# Patient Record
Sex: FEMALE | Race: WHITE | NOT HISPANIC OR LATINO | Employment: OTHER | URBAN - METROPOLITAN AREA
[De-identification: names, ages, dates, MRNs, and addresses within clinical notes are randomized per-mention and may not be internally consistent; named-entity substitution may affect disease eponyms.]

---

## 2017-01-09 ENCOUNTER — GENERIC CONVERSION - ENCOUNTER (OUTPATIENT)
Dept: OTHER | Facility: OTHER | Age: 67
End: 2017-01-09

## 2018-01-12 NOTE — RESULT NOTES
Verified Results  (Q) THINPREP TIS PAP RFX HPV [id number] 12:00AM Alexandra Weaver     Test Name Result Flag Reference   CLINICAL INFORMATION:      17ET P2   LMP:      NONE GIVEN   PREV  PAP:      NO HX ABNL PAP   PREV  BX:      NONE GIVEN   SOURCE:      Cervix, Endocervix   STATEMENT OF ADEQUACY:      SATISFACTORY FOR EVALUATION   INTERPRETATION/RESULT:      Negative for intraepithelial lesion or malignancy  Atrophic pattern; predominantly parabasal cells   COMMENT:      This Pap test has been evaluated with computer  assisted technology     CYTOTECHNOLOGIST:      ADD, CT(ASCP)  CT screening location:  [address] [facility],  [address] [place] [address]

## 2019-02-18 ENCOUNTER — APPOINTMENT (OUTPATIENT)
Dept: LAB | Facility: CLINIC | Age: 69
End: 2019-02-18
Payer: MEDICARE

## 2019-02-18 ENCOUNTER — TRANSCRIBE ORDERS (OUTPATIENT)
Dept: ADMINISTRATIVE | Facility: HOSPITAL | Age: 69
End: 2019-02-18

## 2019-02-18 DIAGNOSIS — R94.6 NONSPECIFIC ABNORMAL RESULTS OF THYROID FUNCTION STUDY: ICD-10-CM

## 2019-02-18 DIAGNOSIS — R94.6 NONSPECIFIC ABNORMAL RESULTS OF THYROID FUNCTION STUDY: Primary | ICD-10-CM

## 2019-02-18 LAB — TSH SERPL DL<=0.05 MIU/L-ACNC: 2.25 UIU/ML (ref 0.36–3.74)

## 2019-02-18 PROCEDURE — 36415 COLL VENOUS BLD VENIPUNCTURE: CPT

## 2019-02-18 PROCEDURE — 84443 ASSAY THYROID STIM HORMONE: CPT

## 2019-07-19 ENCOUNTER — TRANSCRIBE ORDERS (OUTPATIENT)
Dept: ADMINISTRATIVE | Facility: HOSPITAL | Age: 69
End: 2019-07-19

## 2019-07-19 ENCOUNTER — APPOINTMENT (OUTPATIENT)
Dept: LAB | Facility: CLINIC | Age: 69
End: 2019-07-19
Payer: MEDICARE

## 2019-07-19 DIAGNOSIS — E78.5 HYPERLIPIDEMIA, UNSPECIFIED HYPERLIPIDEMIA TYPE: ICD-10-CM

## 2019-07-19 DIAGNOSIS — E78.5 HYPERLIPIDEMIA, UNSPECIFIED HYPERLIPIDEMIA TYPE: Primary | ICD-10-CM

## 2019-07-19 LAB
ALBUMIN SERPL BCP-MCNC: 3.6 G/DL (ref 3.5–5)
ALP SERPL-CCNC: 76 U/L (ref 46–116)
ALT SERPL W P-5'-P-CCNC: 20 U/L (ref 12–78)
ANION GAP SERPL CALCULATED.3IONS-SCNC: 2 MMOL/L (ref 4–13)
AST SERPL W P-5'-P-CCNC: 13 U/L (ref 5–45)
BASOPHILS # BLD AUTO: 0.06 THOUSANDS/ΜL (ref 0–0.1)
BASOPHILS NFR BLD AUTO: 1 % (ref 0–1)
BILIRUB SERPL-MCNC: 0.92 MG/DL (ref 0.2–1)
BUN SERPL-MCNC: 16 MG/DL (ref 5–25)
CALCIUM SERPL-MCNC: 9.2 MG/DL (ref 8.3–10.1)
CHLORIDE SERPL-SCNC: 110 MMOL/L (ref 100–108)
CHOLEST SERPL-MCNC: 263 MG/DL (ref 50–200)
CO2 SERPL-SCNC: 27 MMOL/L (ref 21–32)
CREAT SERPL-MCNC: 0.62 MG/DL (ref 0.6–1.3)
EOSINOPHIL # BLD AUTO: 0.32 THOUSAND/ΜL (ref 0–0.61)
EOSINOPHIL NFR BLD AUTO: 5 % (ref 0–6)
ERYTHROCYTE [DISTWIDTH] IN BLOOD BY AUTOMATED COUNT: 12.5 % (ref 11.6–15.1)
GFR SERPL CREATININE-BSD FRML MDRD: 92 ML/MIN/1.73SQ M
GLUCOSE P FAST SERPL-MCNC: 93 MG/DL (ref 65–99)
HCT VFR BLD AUTO: 37.6 % (ref 34.8–46.1)
HDLC SERPL-MCNC: 62 MG/DL (ref 40–60)
HGB BLD-MCNC: 12.7 G/DL (ref 11.5–15.4)
IMM GRANULOCYTES # BLD AUTO: 0.03 THOUSAND/UL (ref 0–0.2)
IMM GRANULOCYTES NFR BLD AUTO: 1 % (ref 0–2)
LDLC SERPL CALC-MCNC: 180 MG/DL (ref 0–100)
LYMPHOCYTES # BLD AUTO: 2.69 THOUSANDS/ΜL (ref 0.6–4.47)
LYMPHOCYTES NFR BLD AUTO: 41 % (ref 14–44)
MCH RBC QN AUTO: 32.9 PG (ref 26.8–34.3)
MCHC RBC AUTO-ENTMCNC: 33.8 G/DL (ref 31.4–37.4)
MCV RBC AUTO: 97 FL (ref 82–98)
MONOCYTES # BLD AUTO: 0.67 THOUSAND/ΜL (ref 0.17–1.22)
MONOCYTES NFR BLD AUTO: 10 % (ref 4–12)
NEUTROPHILS # BLD AUTO: 2.86 THOUSANDS/ΜL (ref 1.85–7.62)
NEUTS SEG NFR BLD AUTO: 42 % (ref 43–75)
NONHDLC SERPL-MCNC: 201 MG/DL
NRBC BLD AUTO-RTO: 0 /100 WBCS
PLATELET # BLD AUTO: 328 THOUSANDS/UL (ref 149–390)
PMV BLD AUTO: 9.5 FL (ref 8.9–12.7)
POTASSIUM SERPL-SCNC: 4.5 MMOL/L (ref 3.5–5.3)
PROT SERPL-MCNC: 7.5 G/DL (ref 6.4–8.2)
RBC # BLD AUTO: 3.86 MILLION/UL (ref 3.81–5.12)
SODIUM SERPL-SCNC: 139 MMOL/L (ref 136–145)
TRIGL SERPL-MCNC: 107 MG/DL
TSH SERPL DL<=0.05 MIU/L-ACNC: 2.99 UIU/ML (ref 0.36–3.74)
WBC # BLD AUTO: 6.63 THOUSAND/UL (ref 4.31–10.16)

## 2019-07-19 PROCEDURE — 80061 LIPID PANEL: CPT

## 2019-07-19 PROCEDURE — 85025 COMPLETE CBC W/AUTO DIFF WBC: CPT

## 2019-07-19 PROCEDURE — 80053 COMPREHEN METABOLIC PANEL: CPT

## 2019-07-19 PROCEDURE — 36415 COLL VENOUS BLD VENIPUNCTURE: CPT

## 2019-07-19 PROCEDURE — 84443 ASSAY THYROID STIM HORMONE: CPT

## 2019-10-17 ENCOUNTER — APPOINTMENT (OUTPATIENT)
Dept: LAB | Facility: CLINIC | Age: 69
End: 2019-10-17
Payer: MEDICARE

## 2019-10-17 ENCOUNTER — TRANSCRIBE ORDERS (OUTPATIENT)
Dept: ADMINISTRATIVE | Facility: HOSPITAL | Age: 69
End: 2019-10-17

## 2019-10-17 DIAGNOSIS — E78.2 MIXED HYPERLIPIDEMIA: ICD-10-CM

## 2019-10-17 DIAGNOSIS — E78.2 MIXED HYPERLIPIDEMIA: Primary | ICD-10-CM

## 2019-10-17 LAB
ALBUMIN SERPL BCP-MCNC: 3.7 G/DL (ref 3.5–5)
ALP SERPL-CCNC: 92 U/L (ref 46–116)
ALT SERPL W P-5'-P-CCNC: 34 U/L (ref 12–78)
ANION GAP SERPL CALCULATED.3IONS-SCNC: 5 MMOL/L (ref 4–13)
AST SERPL W P-5'-P-CCNC: 22 U/L (ref 5–45)
BILIRUB SERPL-MCNC: 1.34 MG/DL (ref 0.2–1)
BUN SERPL-MCNC: 17 MG/DL (ref 5–25)
CALCIUM SERPL-MCNC: 9.1 MG/DL (ref 8.3–10.1)
CHLORIDE SERPL-SCNC: 107 MMOL/L (ref 100–108)
CHOLEST SERPL-MCNC: 167 MG/DL (ref 50–200)
CO2 SERPL-SCNC: 25 MMOL/L (ref 21–32)
CREAT SERPL-MCNC: 0.68 MG/DL (ref 0.6–1.3)
GFR SERPL CREATININE-BSD FRML MDRD: 89 ML/MIN/1.73SQ M
GLUCOSE P FAST SERPL-MCNC: 95 MG/DL (ref 65–99)
HDLC SERPL-MCNC: 67 MG/DL (ref 40–60)
LDLC SERPL CALC-MCNC: 79 MG/DL (ref 0–100)
NONHDLC SERPL-MCNC: 100 MG/DL
POTASSIUM SERPL-SCNC: 3.8 MMOL/L (ref 3.5–5.3)
PROT SERPL-MCNC: 7.5 G/DL (ref 6.4–8.2)
SODIUM SERPL-SCNC: 137 MMOL/L (ref 136–145)
TRIGL SERPL-MCNC: 103 MG/DL

## 2019-10-17 PROCEDURE — 80061 LIPID PANEL: CPT

## 2019-10-17 PROCEDURE — 80053 COMPREHEN METABOLIC PANEL: CPT

## 2019-10-17 PROCEDURE — 36415 COLL VENOUS BLD VENIPUNCTURE: CPT

## 2020-01-28 ENCOUNTER — LAB REQUISITION (OUTPATIENT)
Dept: LAB | Facility: HOSPITAL | Age: 70
End: 2020-01-28
Payer: MEDICARE

## 2020-01-28 DIAGNOSIS — H18.519 ENDOTHELIAL CORNEAL DYSTROPHY: ICD-10-CM

## 2020-01-29 PROCEDURE — 87102 FUNGUS ISOLATION CULTURE: CPT | Performed by: OPHTHALMOLOGY

## 2020-01-29 PROCEDURE — 87075 CULTR BACTERIA EXCEPT BLOOD: CPT | Performed by: OPHTHALMOLOGY

## 2020-01-29 PROCEDURE — 87205 SMEAR GRAM STAIN: CPT | Performed by: OPHTHALMOLOGY

## 2020-01-29 PROCEDURE — 87070 CULTURE OTHR SPECIMN AEROBIC: CPT | Performed by: OPHTHALMOLOGY

## 2020-01-31 LAB
BACTERIA EYE AEROBE CULT: NO GROWTH
BACTERIA SPEC ANAEROBE CULT: NO GROWTH
GRAM STN SPEC: NORMAL

## 2020-03-02 LAB — FUNGUS SPEC CULT: NORMAL

## 2020-12-21 ENCOUNTER — TRANSCRIBE ORDERS (OUTPATIENT)
Dept: ADMINISTRATIVE | Facility: HOSPITAL | Age: 70
End: 2020-12-21

## 2020-12-21 DIAGNOSIS — Z12.31 ENCOUNTER FOR SCREENING MAMMOGRAM FOR MALIGNANT NEOPLASM OF BREAST: Primary | ICD-10-CM

## 2021-03-04 DIAGNOSIS — Z23 ENCOUNTER FOR IMMUNIZATION: ICD-10-CM

## 2021-07-22 ENCOUNTER — HOSPITAL ENCOUNTER (OUTPATIENT)
Dept: MAMMOGRAPHY | Facility: IMAGING CENTER | Age: 71
Discharge: HOME | End: 2021-07-22
Payer: MEDICARE

## 2021-07-22 VITALS — WEIGHT: 160 LBS | HEIGHT: 64 IN | BODY MASS INDEX: 27.31 KG/M2

## 2021-07-22 DIAGNOSIS — Z12.31 ENCOUNTER FOR SCREENING MAMMOGRAM FOR MALIGNANT NEOPLASM OF BREAST: ICD-10-CM

## 2021-07-22 PROCEDURE — 77063 BREAST TOMOSYNTHESIS BI: CPT

## 2021-07-22 PROCEDURE — 77067 SCR MAMMO BI INCL CAD: CPT

## 2021-09-03 ENCOUNTER — NEW PATIENT (OUTPATIENT)
Dept: URBAN - METROPOLITAN AREA CLINIC 6 | Facility: CLINIC | Age: 71
End: 2021-09-03

## 2021-09-03 DIAGNOSIS — H40.1192: ICD-10-CM

## 2021-09-03 DIAGNOSIS — H18.512: ICD-10-CM

## 2021-09-03 PROCEDURE — 92004 COMPRE OPH EXAM NEW PT 1/>: CPT

## 2021-09-03 PROCEDURE — 92020 GONIOSCOPY: CPT

## 2021-09-03 PROCEDURE — 92133 CPTRZD OPH DX IMG PST SGM ON: CPT

## 2021-09-03 PROCEDURE — 92202 OPSCPY EXTND ON/MAC DRAW: CPT

## 2021-09-03 PROCEDURE — 76514 ECHO EXAM OF EYE THICKNESS: CPT

## 2021-09-03 ASSESSMENT — PACHYMETRY
OD_CT_UM: 589
OS_CT_UM: 593

## 2021-09-03 ASSESSMENT — VISUAL ACUITY
OD_CC: 20/30+1
OS_CC: 20/30

## 2021-09-03 ASSESSMENT — TONOMETRY
OD_IOP_MMHG: 20
OS_IOP_MMHG: 18

## 2022-04-06 ENCOUNTER — APPOINTMENT (OUTPATIENT)
Dept: LAB | Facility: CLINIC | Age: 72
End: 2022-04-06
Payer: MEDICARE

## 2022-04-06 DIAGNOSIS — E03.8 TOXIC DIFFUSE GOITER WITH PRETIBIAL MYXEDEMA: ICD-10-CM

## 2022-04-06 DIAGNOSIS — E55.9 AVITAMINOSIS D: ICD-10-CM

## 2022-04-06 DIAGNOSIS — E78.2 MIXED HYPERLIPIDEMIA: ICD-10-CM

## 2022-04-06 DIAGNOSIS — E05.00 TOXIC DIFFUSE GOITER WITH PRETIBIAL MYXEDEMA: ICD-10-CM

## 2022-04-06 LAB
25(OH)D3 SERPL-MCNC: 57.6 NG/ML (ref 30–100)
ALBUMIN SERPL BCP-MCNC: 3.7 G/DL (ref 3.5–5)
ALP SERPL-CCNC: 68 U/L (ref 46–116)
ALT SERPL W P-5'-P-CCNC: 25 U/L (ref 12–78)
ANION GAP SERPL CALCULATED.3IONS-SCNC: 3 MMOL/L (ref 4–13)
AST SERPL W P-5'-P-CCNC: 23 U/L (ref 5–45)
BASOPHILS # BLD AUTO: 0.04 THOUSANDS/ΜL (ref 0–0.1)
BASOPHILS NFR BLD AUTO: 1 % (ref 0–1)
BILIRUB SERPL-MCNC: 1.13 MG/DL (ref 0.2–1)
BUN SERPL-MCNC: 9 MG/DL (ref 5–25)
CALCIUM SERPL-MCNC: 9.5 MG/DL (ref 8.3–10.1)
CHLORIDE SERPL-SCNC: 111 MMOL/L (ref 100–108)
CHOLEST SERPL-MCNC: 150 MG/DL
CO2 SERPL-SCNC: 27 MMOL/L (ref 21–32)
CREAT SERPL-MCNC: 0.65 MG/DL (ref 0.6–1.3)
EOSINOPHIL # BLD AUTO: 0.29 THOUSAND/ΜL (ref 0–0.61)
EOSINOPHIL NFR BLD AUTO: 5 % (ref 0–6)
ERYTHROCYTE [DISTWIDTH] IN BLOOD BY AUTOMATED COUNT: 12.5 % (ref 11.6–15.1)
GFR SERPL CREATININE-BSD FRML MDRD: 88 ML/MIN/1.73SQ M
GLUCOSE P FAST SERPL-MCNC: 101 MG/DL (ref 65–99)
HCT VFR BLD AUTO: 39.8 % (ref 34.8–46.1)
HDLC SERPL-MCNC: 63 MG/DL
HGB BLD-MCNC: 13.3 G/DL (ref 11.5–15.4)
IMM GRANULOCYTES # BLD AUTO: 0.02 THOUSAND/UL (ref 0–0.2)
IMM GRANULOCYTES NFR BLD AUTO: 0 % (ref 0–2)
LDLC SERPL CALC-MCNC: 72 MG/DL (ref 0–100)
LYMPHOCYTES # BLD AUTO: 2.21 THOUSANDS/ΜL (ref 0.6–4.47)
LYMPHOCYTES NFR BLD AUTO: 41 % (ref 14–44)
MCH RBC QN AUTO: 32.6 PG (ref 26.8–34.3)
MCHC RBC AUTO-ENTMCNC: 33.4 G/DL (ref 31.4–37.4)
MCV RBC AUTO: 98 FL (ref 82–98)
MONOCYTES # BLD AUTO: 0.55 THOUSAND/ΜL (ref 0.17–1.22)
MONOCYTES NFR BLD AUTO: 10 % (ref 4–12)
NEUTROPHILS # BLD AUTO: 2.31 THOUSANDS/ΜL (ref 1.85–7.62)
NEUTS SEG NFR BLD AUTO: 43 % (ref 43–75)
NONHDLC SERPL-MCNC: 87 MG/DL
NRBC BLD AUTO-RTO: 0 /100 WBCS
PLATELET # BLD AUTO: 322 THOUSANDS/UL (ref 149–390)
PMV BLD AUTO: 9.4 FL (ref 8.9–12.7)
POTASSIUM SERPL-SCNC: 4.2 MMOL/L (ref 3.5–5.3)
PROT SERPL-MCNC: 7.3 G/DL (ref 6.4–8.2)
RBC # BLD AUTO: 4.08 MILLION/UL (ref 3.81–5.12)
SODIUM SERPL-SCNC: 141 MMOL/L (ref 136–145)
T4 FREE SERPL-MCNC: 1.04 NG/DL (ref 0.76–1.46)
TRIGL SERPL-MCNC: 77 MG/DL
TSH SERPL DL<=0.05 MIU/L-ACNC: 4.74 UIU/ML (ref 0.45–4.5)
WBC # BLD AUTO: 5.42 THOUSAND/UL (ref 4.31–10.16)

## 2022-04-06 PROCEDURE — 85025 COMPLETE CBC W/AUTO DIFF WBC: CPT

## 2022-04-06 PROCEDURE — 82306 VITAMIN D 25 HYDROXY: CPT

## 2022-04-06 PROCEDURE — 80061 LIPID PANEL: CPT

## 2022-04-06 PROCEDURE — 36415 COLL VENOUS BLD VENIPUNCTURE: CPT

## 2022-04-06 PROCEDURE — 84439 ASSAY OF FREE THYROXINE: CPT

## 2022-04-06 PROCEDURE — 80053 COMPREHEN METABOLIC PANEL: CPT

## 2022-04-06 PROCEDURE — 84443 ASSAY THYROID STIM HORMONE: CPT

## 2022-08-24 ENCOUNTER — HOSPITAL ENCOUNTER (OUTPATIENT)
Dept: MAMMOGRAPHY | Facility: IMAGING CENTER | Age: 72
Discharge: HOME | End: 2022-08-24
Payer: MEDICARE

## 2022-08-24 VITALS — BODY MASS INDEX: 23.73 KG/M2 | WEIGHT: 139 LBS | HEIGHT: 64 IN

## 2022-08-24 DIAGNOSIS — Z12.31 ENCOUNTER FOR SCREENING MAMMOGRAM FOR MALIGNANT NEOPLASM OF BREAST: ICD-10-CM

## 2022-08-24 PROCEDURE — 77063 BREAST TOMOSYNTHESIS BI: CPT

## 2022-08-24 PROCEDURE — 77067 SCR MAMMO BI INCL CAD: CPT

## 2023-09-21 ENCOUNTER — HOSPITAL ENCOUNTER (OUTPATIENT)
Dept: MAMMOGRAPHY | Facility: IMAGING CENTER | Age: 73
Discharge: HOME | End: 2023-09-21
Payer: MEDICARE

## 2023-09-21 VITALS — WEIGHT: 150 LBS | HEIGHT: 64 IN | BODY MASS INDEX: 25.61 KG/M2

## 2023-09-21 DIAGNOSIS — Z12.31 ENCOUNTER FOR SCREENING MAMMOGRAM FOR MALIGNANT NEOPLASM OF BREAST: ICD-10-CM

## 2023-09-21 PROCEDURE — 77063 BREAST TOMOSYNTHESIS BI: CPT

## 2023-09-21 PROCEDURE — 77067 SCR MAMMO BI INCL CAD: CPT

## 2024-02-14 ENCOUNTER — OFFICE VISIT (OUTPATIENT)
Dept: DERMATOLOGY | Facility: CLINIC | Age: 74
End: 2024-02-14
Payer: MEDICARE

## 2024-02-14 VITALS — HEIGHT: 64 IN | TEMPERATURE: 98.9 F | BODY MASS INDEX: 27.31 KG/M2 | WEIGHT: 160 LBS

## 2024-02-14 DIAGNOSIS — D18.01 CHERRY ANGIOMA: ICD-10-CM

## 2024-02-14 DIAGNOSIS — Z85.820 HISTORY OF MELANOMA: Primary | ICD-10-CM

## 2024-02-14 DIAGNOSIS — D22.61 MULTIPLE BENIGN MELANOCYTIC NEVI OF BOTH UPPER EXTREMITIES, BOTH LOWER EXTREMITIES, AND TRUNK: ICD-10-CM

## 2024-02-14 DIAGNOSIS — L81.4 SOLAR LENTIGO: ICD-10-CM

## 2024-02-14 DIAGNOSIS — L21.9 SEBORRHEIC DERMATITIS: ICD-10-CM

## 2024-02-14 DIAGNOSIS — L57.0 KERATOSIS, ACTINIC: ICD-10-CM

## 2024-02-14 DIAGNOSIS — D22.71 MULTIPLE BENIGN MELANOCYTIC NEVI OF BOTH UPPER EXTREMITIES, BOTH LOWER EXTREMITIES, AND TRUNK: ICD-10-CM

## 2024-02-14 DIAGNOSIS — L57.8 OTHER SKIN CHANGES DUE TO CHRONIC EXPOSURE TO NONIONIZING RADIATION: ICD-10-CM

## 2024-02-14 DIAGNOSIS — D22.72 MULTIPLE BENIGN MELANOCYTIC NEVI OF BOTH UPPER EXTREMITIES, BOTH LOWER EXTREMITIES, AND TRUNK: ICD-10-CM

## 2024-02-14 DIAGNOSIS — D22.5 MULTIPLE BENIGN MELANOCYTIC NEVI OF BOTH UPPER EXTREMITIES, BOTH LOWER EXTREMITIES, AND TRUNK: ICD-10-CM

## 2024-02-14 DIAGNOSIS — D22.62 MULTIPLE BENIGN MELANOCYTIC NEVI OF BOTH UPPER EXTREMITIES, BOTH LOWER EXTREMITIES, AND TRUNK: ICD-10-CM

## 2024-02-14 DIAGNOSIS — L82.1 SEBORRHEIC KERATOSIS: ICD-10-CM

## 2024-02-14 PROCEDURE — 17003 DESTRUCT PREMALG LES 2-14: CPT | Performed by: DERMATOLOGY

## 2024-02-14 PROCEDURE — 99204 OFFICE O/P NEW MOD 45 MIN: CPT | Performed by: DERMATOLOGY

## 2024-02-14 PROCEDURE — 17000 DESTRUCT PREMALG LESION: CPT | Performed by: DERMATOLOGY

## 2024-02-14 RX ORDER — KETOCONAZOLE 20 MG/G
CREAM TOPICAL DAILY
Qty: 30 G | Refills: 2 | Status: SHIPPED | OUTPATIENT
Start: 2024-02-14

## 2024-02-14 NOTE — PROGRESS NOTES
"[facility] Note     Patient Name: Kelli Arias  Encounter Date: 02/014/24     Have you been cared for by a [facility] Dermatologist in the last 3 years and, if so, which description applies to you?    NO.   I am considered a \"new\" patient and must complete all patient intake questions. I am FEMALE/of child-bearing potential.    REVIEW OF SYSTEMS:  Have you recently had or currently have any of the following? Recent fever or chills? No  Any non-healing wound? No  Are you pregnant or planning to become pregnant? No  Are you currently or planning to be nursing or breast feeding? No   PAST MEDICAL HISTORY:  Have you personally ever had or currently have any of the following?  If \"YES,\" then please provide more detail. Skin cancer (such as Melanoma, Basal Cell Carcinoma, Squamous Cell Carcinoma?  No  Tuberculosis, HIV/AIDS, Hepatitis B or C: No  Radiation Treatment  Hx of MM back    HISTORY OF IMMUNOSUPPRESSION:   Do you have a history of any of the following:  Systemic Immunosuppression such as Diabetes, Biologic or Immunotherapy, Chemotherapy, Organ Transplantation, Bone Marrow Transplantation?  No    Answering \"YES\" requires the addition of the dotphrase \"IMMUNOSUPPRESSED\" as the first diagnosis of the patient's visit.   FAMILY HISTORY:  Any \"first degree relatives\" (parent, brother, sister, or child) with the following?    Skin Cancer, Pancreatic or Other Cancer? YES, MM Grandfather    PATIENT EXPERIENCE:    Do you want the Dermatologist to perform a COMPLETE skin exam today including a clinical examination under the \"bra and underwear\" areas?  Yes  If necessary, do we have your permission to call and leave a detailed message on your Preferred Phone number that includes your specific medical information?  Yes      Allergies   Allergen Reactions    Other Other (See Comments)     Scallops - vomiting      Penicillins Hives    Erythromycin GI Intolerance     Abdominal cramping    Sulfa " Antibiotics Other (See Comments)     Unknown childhood reaction      Current Outpatient Medications:     Ascorbic Acid (VITAMIN C) 1000 MG tablet, Take 1,000 mg by mouth daily., Disp: , Rfl:     brimonidine-timolol (COMBIGAN) 0.2-0.5 %, Administer 1 drop to both eyes every 12 (twelve) hours., Disp: , Rfl:     calcium-vitamin D 250-100 MG-UNIT per tablet, Take 1 tablet by mouth 2 (two) times a day. 1200 mg, Disp: , Rfl:     glucosamine-chondroitin 500-400 MG tablet, Take 2 tablets by mouth daily., Disp: , Rfl:     levothyroxine 50 mcg tablet, Take 50 mcg by mouth daily., Disp: , Rfl:     Lutein-Zeaxanthin 25-5 MG CAPS, Take 1 tablet by mouth daily., Disp: , Rfl:     Meclizine HCl (BONINE PO), Take 1 tablet by mouth daily as needed., Disp: , Rfl:     Multiple Vitamin (MULTIVITAMIN) tablet, Take 1 tablet by mouth daily., Disp: , Rfl:     omega-3-acid ethyl esters (LOVAZA) 1 g capsule, Take 2 g by mouth daily., Disp: , Rfl:     sodium chloride (TIGIST 128) 5 % hypertonic ophthalmic solution, Administer 1 drop to both eyes as needed., Disp: , Rfl:     travoprost (TRAVATAN Z) 0.004 % ophthalmic solution, Administer 1 drop to both eyes daily at bedtime., Disp: , Rfl:     VITAMIN D, CHOLECALCIFEROL, PO, Take 2,100 Units by mouth daily., Disp: , Rfl:           Whom besides the patient is providing clinical information about today's encounter?   NO ADDITIONAL HISTORIAN (patient alone provided history)    Physical Exam and Assessment/Plan by Diagnosis:      HISTORY OF MELANOMA    Physical Exam:  Anatomic Location Affected:  back  Morphological Description of Scar:  Well healed   Year Treated: 2009  TNM Classification: Unsure noted they only did excision treatment   Suspected Recurrence: no  Regional adenopathy: no    Additional History of Present Condition:  Patient states MM was excised in 2009     Assessment and Plan:  Based on a thorough discussion of this condition and the management approach to it (including a comprehensive  "discussion of the known risks, side effects and potential benefits of treatment), the patient (family) agrees to implement the following specific plan:  Sun protection  Sunscreen spf 50 higher   Skin check 6 months     What happens at follow-up?  The main purpose of follow-up is to detect recurrences early (metastatic melanoma), but it also offers an opportunity to diagnose a new primary melanoma at the first possible opportunity. A second invasive melanoma occurs in 5-10% of melanoma patients and a new melanoma in situ is diagnosed in more than 20% of melanoma patients.    Our practice makes the following recommendations for follow-up for patients with invasive melanoma.  At-least \"monthly\" self-skin examinations   Routine skin checks by a board certified dermatologist  Follow-up intervals are \"every 3 months\" within 2 years of a new melanoma diagnosis; \"every 6 months\" between 2-4 years of a new melanoma diagnosis; and \"annually\" after 4 years of a new melanoma diagnosis  Individual patient's needs should be considered before an appropriate follow-up is offered  Provide education and support to help the patient adjust to their illness    Follow-up appointments should include:  A check of the scar where the primary melanoma was removed  Checking the regional lymph nodes  A general skin examination  A full physical examination at least annually by your primary care physician    In those with more advanced primary disease, follow-up may include:  Blood tests  Imaging: ultrasound, X-ray, CT, MRI and PET scan.    Most tests are not worthwhile for patients with stage 1 or 2 melanoma unless there are signs or symptoms of disease recurrence or metastasis. No tests are necessary for healthy patients who have remained well for five years or longer after removal of their melanoma.    What is the outlook for patients with melanoma?  Melanoma in situ is cured by excision because it has no potential to spread around the " body.  The risk of spread and ultimate death from invasive melanoma depends on several factors, but the main one is the Breslow thickness of the melanoma at the time it was surgically removed.  Metastases are rare for melanomas < 0.75 mm and the risk for tumours 0.75-1 mm thick is about 5%. The risk steadily increases with thickness so that melanomas > 4 mm have a risk of metastasis of about 40%.    Melanoma is a potentially serious type of skin cancer, in which there is uncontrolled growth of melanocytes (pigment cells). Melanoma is sometimes called malignant melanoma.  Normal melanocytes are found in the basal layer of the epidermis (the outer layer of skin). Melanocytes produce a protein called melanin, which protects skin cells by absorbing ultraviolet (UV) radiation. Melanocytes are found in equal numbers in black and white skin, but melanocytes in black skin produce much more melanin. People with dark brown or black skin are very much less likely to be damaged by UV radiation than those with white skin.     SEBORRHEIC DERMATITIS     Physical Exam:  Anatomic Location Affected &  Morphological Description:  Greasy adherent scale/scaling plaques on the  NLFs .    Additional History of Present Condition:  Rash on the nasolabial fold, present for 1 month. No new medications or face creams or wash. No tx   Duration:1 month  Attempted treatments:none    Assessment and Plan:  History and physical exam most consistent with seborrheic dermatitis. The chronic nature of this condition and association with normal skin yeast were reviewed. Educated that the goal of therapy is to control symptoms, but this is not typically something we cure and intermittent flares can be expected. Based on a thorough discussion of this condition and the management approach to it (including a comprehensive discussion of the known risks, side effects and potential benefits of treatment), the patient (family) agrees to implement the following  specific plan:    Start ketoconazole cream daily . This is NOT a steroid, is safe for long-term everyday use. If you use this daily this will keep the rash on your face under control and help prevent flares of the flaking.      ACTINIC KERATOSES  Physical Exam:  Anatomic Location Affected:  nose, right eyebrow  Morphological Description:  Thin pink papule(s) with gritty scale       Assessment and Plan:  Based on a thorough discussion of this condition and the management approach to it (including a comprehensive discussion of the known risks, side effects and potential benefits of treatment), the patient (family) agrees to implement the following specific plan:  Treated with cryotherapy today; written and verbal consent obtained     PROCEDURE:  DESTRUCTION OF PRE-MALIGNANT LESIONS  After a thorough discussion of treatment options and risk/benefits/alternatives (including but not limited to local pain, scarring, dyspigmentation, blistering, and possible superinfection), verbal and written consent were obtained and the aforementioned lesions were treated on with cryotherapy using liquid nitrogen x 2 cycles for 5-10 seconds. The patient tolerated the procedure well, and after-care instructions were provided.     TOTAL NUMBER of 2 pre-malignant lesions were treated today on the ANATOMIC LOCATION: nose, right eyebrow.       Patient instructions:  Your pre-cancerous lesions (called actinic keratosis) were treated with liquid nitrogen today. The treated areas will get more red, crusted over the next few days. There might be some blistering. Apply vaseline to the treated area for the next week to help it heal fully. Do not pick at the area. Return in 3-4 weeks for another round of liquid nitrogen treatment if lesion(s)  fails to fully resolve.          VALDOVINOS ANGIOMAS     Physical Exam:  Anatomic Location Affected:  Trunk and extremities  Morphological Description:  Scattered cherry red papules  Denies pain, itch, bleeding.  "No treatments tried. Present for years. Present constantly; no modifying factors which make it worse or better.     Assessment and Plan:  Based on a thorough discussion of this condition and the management approach to it (including a comprehensive discussion of the known risks, side effects and potential benefits of treatment), the patient (family) agrees to implement the following specific plan:  Reassure benign    SEBORRHEIC KERATOSIS; NON-INFLAMED     Physical Exam:  Anatomic Location Affected:  Trunk and extremities  Morphological Description:  Waxy, smooth to warty textured, yellow to brownish-grey to dark brown to blackish, discrete, \"stuck-on\" appearing papules.  Present for years. Denies pain, itch, bleeding.      Additional History of Present Condition:  Present constantly; no modifying factors which make it worse or better. No prior treatment.       Assessment and Plan:  Based on a thorough discussion of this condition and the management approach to it (including a comprehensive discussion of the known risks, side effects and potential benefits of treatment), the patient (family) agrees to implement the following specific plan:  Reassure benign  Use sun protection.  Apply SPF 30 or higher at least three times a day.  Wear sun protecting clothing and hats.        SOLAR LENTIGINES   OTHER SKIN CHANGES DUE TO CHRONIC EXPOSURE TO NONIONIZING RADIATION     Physical Exam:  Anatomic Location Affected:  Sun exposed areas of back, chest, arms, legs  Morphological Description:  Multiple scattered brown to tan evenly pigmented macules   Denies pain, itch, bleeding. No treatments tried. Present for months - years. Reports getting newer lesions with sun exposure.         Assessment and Plan:  Based on a thorough discussion of this condition and the management approach to it (including a comprehensive discussion of the known risks, side effects and potential benefits of treatment), the patient (family) agrees to " "implement the following specific plan:  Reassure benign  Use sun protection.  Apply SPF 30 or higher at least three times a day.  Wear sun protecting clothing and hats.         MULTIPLE MELANOCYTIC NEVI (\"Moles\")     Physical Exam:  Anatomic Location Affected: Trunk and extremities  Morphological Description:  Scattered, round to ovoid, symmetrical-appearing, even bordered, skin colored to dark brown macules/papules  Denies pain, itch, bleeding. No treatments tried. Present for years. Present constantly; no modifying factors which make it worse or better. Denies actively changing or growing moles.      Assessment and Plan:  Based on a thorough discussion of this condition and the management approach to it (including a comprehensive discussion of the known risks, side effects and potential benefits of treatment), the patient (family) agrees to implement the following specific plan:  Reassure benign  Monitor for changes  Use sun protection.  Apply SPF 30 or higher at least three times a day.  Wear sun protecting clothing and hats.       Worrisome signs of skin malignancy discussed, questions answered. Regular self-skin check discussed. Advised to call or return to office if patient notices any spots of concern, rapidly growing/changing lesions, bleeding lesions, non-healing lesions. Advised regular SPF use.      Scribe Attestation      I,:  Jose Estrella am acting as a scribe while in the presence of the attending physician.:       I,:  Raman Marie MD personally performed the services described in this documentation    as scribed in my presence.:              "

## 2024-02-14 NOTE — PATIENT INSTRUCTIONS
Patient instructions:  Your pre-cancerous lesions (called actinic keratosis) were treated with liquid nitrogen today. The treated areas will get more red, crusted over the next few days. There might be some blistering. Apply vaseline to the treated area for the next week to help it heal fully. Do not pick at the area. Return in 3-4 weeks for another round of liquid nitrogen treatment if lesion(s)  fails to fully resolve.        Start ketoconazole cream daily as a maintenance. This is NOT a steroid, is safe for long-term everyday use. If you use this daily this will keep the rash on your face under control and help prevent flares of the flaking.

## 2024-11-16 ENCOUNTER — HOSPITAL ENCOUNTER (OUTPATIENT)
Dept: MAMMOGRAPHY | Facility: CLINIC | Age: 74
Discharge: HOME | End: 2024-11-16
Payer: MEDICARE

## 2024-11-16 VITALS — HEIGHT: 64 IN | WEIGHT: 153 LBS | BODY MASS INDEX: 26.12 KG/M2

## 2024-11-16 DIAGNOSIS — Z12.31 ENCOUNTER FOR SCREENING MAMMOGRAM FOR MALIGNANT NEOPLASM OF BREAST: ICD-10-CM

## 2024-11-16 PROCEDURE — 77063 BREAST TOMOSYNTHESIS BI: CPT

## 2024-11-16 PROCEDURE — 77067 SCR MAMMO BI INCL CAD: CPT

## 2025-01-16 ENCOUNTER — EVALUATION (OUTPATIENT)
Dept: PHYSICAL THERAPY | Facility: CLINIC | Age: 75
End: 2025-01-16
Payer: MEDICARE

## 2025-01-16 DIAGNOSIS — M25.552 BILATERAL HIP PAIN: ICD-10-CM

## 2025-01-16 DIAGNOSIS — G89.29 CHRONIC BILATERAL LOW BACK PAIN, UNSPECIFIED WHETHER SCIATICA PRESENT: Primary | ICD-10-CM

## 2025-01-16 DIAGNOSIS — M54.50 CHRONIC BILATERAL LOW BACK PAIN, UNSPECIFIED WHETHER SCIATICA PRESENT: Primary | ICD-10-CM

## 2025-01-16 DIAGNOSIS — M25.551 BILATERAL HIP PAIN: ICD-10-CM

## 2025-01-16 PROCEDURE — 97110 THERAPEUTIC EXERCISES: CPT | Performed by: PHYSICAL THERAPIST

## 2025-01-16 PROCEDURE — 97161 PT EVAL LOW COMPLEX 20 MIN: CPT | Performed by: PHYSICAL THERAPIST

## 2025-01-16 NOTE — LETTER
2025    Jose Enrique Pappas MD  [address]  [address] [address]    Patient: Kelli Arias   YOB: 1950   Date of Visit: 2025     Encounter Diagnosis     ICD-10-CM    1. Chronic bilateral low back pain, unspecified whether sciatica present  M54.50     G89.29       2. Bilateral hip pain  M25.551     M25.552           Dear Dr. Pappas:    Thank you for your recent referral of Kelli Arias. Please review the attached evaluation summary from Kelli's recent visit.     Please verify that you agree with the plan of care by signing the attached order.     If you have any questions or concerns, please do not hesitate to call.     I sincerely appreciate the opportunity to share in the care of one of your patients and hope to have another opportunity to work with you in the near future.       Sincerely,    Román Wetzel, PT      Referring Provider:      I certify that I have read the below Plan of Care and certify the need for these services furnished under this plan of treatment while under my care.                    Jose Enrique Pappas MD  [address]  [address] [address]  Via Fax: [phone]          PT Evaluation     Today's date: 2025  Patient name: Kelli Arias  : 1950  MRN: [id number]  Referring provider: Jose Enrique Pappas MD  Dx:   Encounter Diagnosis     ICD-10-CM    1. Chronic bilateral low back pain, unspecified whether sciatica present  M54.50     G89.29       2. Bilateral hip pain  M25.551     M25.552                      Assessment  Impairments: abnormal gait, abnormal or restricted ROM, impaired physical strength, lacks appropriate home exercise program and pain with function  Symptom irritability: low    Assessment details: Pt is a 74 y.o. female presenting to PT with chronic low back pain and B hip pain. PT findings include: strength deficits of hip stabilizers, core strength deficits, soft tissue tension of B glute  med/max. Pt with fairly good flexibility within low back and hips, negative for hip special tests. Likely discomfort is secondary to strength deficits of hip leading to increased lumbar loads with dynamic WB activities.  Pt educated on PT findings, anatomy, biomechanics, POC and rehab course. Reviewed hip abductor and hip extensor role is lumbar mechanics when lifting and ambulation.Pt will benefit from skilled PT to address above impairments to return to PLOF with less restriction and to reach functional goals.   Understanding of Dx/Px/POC: good     Prognosis: good    Goals  1. Pt will demonstrate understanding of HEP and POC in order to improve compliance with course of rehab in 2 weeks.  2. Pt will ambulate with minimal gait deviations in 4 weeks.   3. Pt's hip ER/ABD MMT will improve by at least 1 grade to decrease hip loads with ambulation by d/c.   4. Pt's pain will be 2/10 or less to allow pt to return to PLOF with least restriction by d/c.     Plan  Patient would benefit from: skilled physical therapy    Planned therapy interventions: joint mobilization, manual therapy, neuromuscular re-education, strengthening, stretching, therapeutic activities, therapeutic exercise, home exercise program, functional ROM exercises and flexibility    Frequency: 2x week  Duration in weeks: 8  Treatment plan discussed with: patient      Subjective Evaluation    History of Present Illness  Mechanism of injury: Pt arrives to PT with chief complaints of low back stiffness and B hip stiffness (L>R). She states having treatment for herniated disc decades ago. She has x-ray findings of spondylolisthesis but this is well managed with her own exercises involving strengthening and stretching. Pain level is low however primarily stiffness is primary complaint. with findings of L4, L5.    She has pain relief with lying on bed, pain with long duration standing, bending and lifting.      Quality of life: good    Patient Goals  Patient  goals for therapy: decreased pain, increased motion, increased strength and independence with ADLs/IADLs    Pain  Current pain ratin  At best pain ratin  At worst pain ratin  Location: low back, B pain  Quality: tight, discomfort and dull ache          Objective    Flowsheet Rows      Flowsheet Row Most Recent Value   PT/OT G-Codes    Current Score 64   Projected Score 69            Lumbar ROM:  Flex: WNL  Ext: WNL  SB: symmetrical and normal B     Core stability:  Good posterior pelvic tilt  Fair stability with abdominals in sagittal plane and transverse plane    Hip strength:  Hip abd: 3+/5; 4/5  Hip ER: 3+/5; 4/5  Hip Ext: 3+/5; 3+/5    Hip ROM:  Flex: 115° B  Hip ER: 60° B  Hip IR: 20° B (slight glute discomfort B)    Hip special tests:  Scour negative  Femoral impingement negative  LYLY: negative    Palpation:  Lumbar paraspinal soft tissue tension and tenderness B  Glute med/max tension B    Joint mobility:  Lumbar PA: L1-S1 normal  Lumbar UPA: L1/L2-L5/S1 normal B       Precautions: L4 L5 spondlylolisthesis      Manuals             Lumbar paraspinal release             Glute release                                        Neuro Re-Ed             Isometric abdominal crunch             bridge HEP            Paloff press             Row             Shoulder ext             deadbug                          Ther Ex             S/l clam Red HEP            S/l hip abduction HEP            Tail wag HEP            Cat cow HEP            Lateral band walk             HEP review Reviewed, dispense d red TB                                      Ther Activity                                       Gait Training                                       Modalities

## 2025-01-16 NOTE — PROGRESS NOTES
PT Evaluation     Today's date: 2025  Patient name: Kelli Arias  : 1950  MRN: [id number]  Referring provider: Jose Enrique Pappas MD  Dx:   Encounter Diagnosis     ICD-10-CM    1. Chronic bilateral low back pain, unspecified whether sciatica present  M54.50     G89.29       2. Bilateral hip pain  M25.551     M25.552                      Assessment  Impairments: abnormal gait, abnormal or restricted ROM, impaired physical strength, lacks appropriate home exercise program and pain with function  Symptom irritability: low    Assessment details: Pt is a 74 y.o. female presenting to PT with chronic low back pain and B hip pain. PT findings include: strength deficits of hip stabilizers, core strength deficits, soft tissue tension of B glute med/max. Pt with fairly good flexibility within low back and hips, negative for hip special tests. Likely discomfort is secondary to strength deficits of hip leading to increased lumbar loads with dynamic WB activities.  Pt educated on PT findings, anatomy, biomechanics, POC and rehab course. Reviewed hip abductor and hip extensor role is lumbar mechanics when lifting and ambulation.Pt will benefit from skilled PT to address above impairments to return to PLOF with less restriction and to reach functional goals.   Understanding of Dx/Px/POC: good     Prognosis: good    Goals  1. Pt will demonstrate understanding of HEP and POC in order to improve compliance with course of rehab in 2 weeks.  2. Pt will ambulate with minimal gait deviations in 4 weeks.   3. Pt's hip ER/ABD MMT will improve by at least 1 grade to decrease hip loads with ambulation by d/c.   4. Pt's pain will be 2/10 or less to allow pt to return to PLOF with least restriction by d/c.     Plan  Patient would benefit from: skilled physical therapy    Planned therapy interventions: joint mobilization, manual therapy, neuromuscular re-education, strengthening, stretching, therapeutic activities, therapeutic  exercise, home exercise program, functional ROM exercises and flexibility    Frequency: 2x week  Duration in weeks: 8  Treatment plan discussed with: patient      Subjective Evaluation    History of Present Illness  Mechanism of injury: Pt arrives to PT with chief complaints of low back stiffness and B hip stiffness (L>R). She states having treatment for herniated disc decades ago. She has x-ray findings of spondylolisthesis but this is well managed with her own exercises involving strengthening and stretching. Pain level is low however primarily stiffness is primary complaint. with findings of L4, L5.    She has pain relief with lying on bed, pain with long duration standing, bending and lifting.      Quality of life: good    Patient Goals  Patient goals for therapy: decreased pain, increased motion, increased strength and independence with ADLs/IADLs    Pain  Current pain ratin  At best pain ratin  At worst pain ratin  Location: low back, B pain  Quality: tight, discomfort and dull ache          Objective    Flowsheet Rows      Flowsheet Row Most Recent Value   PT/OT G-Codes    Current Score 64   Projected Score 69            Lumbar ROM:  Flex: WNL  Ext: WNL  SB: symmetrical and normal B     Core stability:  Good posterior pelvic tilt  Fair stability with abdominals in sagittal plane and transverse plane    Hip strength:  Hip abd: 3+/5; 4/5  Hip ER: 3+/5; 4/5  Hip Ext: 3+/5; 3+/5    Hip ROM:  Flex: 115° B  Hip ER: 60° B  Hip IR: 20° B (slight glute discomfort B)    Hip special tests:  Scour negative  Femoral impingement negative  LYLY: negative    Palpation:  Lumbar paraspinal soft tissue tension and tenderness B  Glute med/max tension B    Joint mobility:  Lumbar PA: L1-S1 normal  Lumbar UPA: L1/L2-L5/S1 normal B       Precautions: L4 L5 spondlylolisthesis      Manuals             Lumbar paraspinal release             Glute release                                        Neuro Re-Ed              Isometric abdominal crunch             bridge HEP            Paloff press             Row             Shoulder ext             deadbug                          Ther Ex             S/l clam Red HEP            S/l hip abduction HEP            Tail wag HEP            Cat cow HEP            Lateral band walk             HEP review Reviewed, dispense d red TB                                      Ther Activity                                       Gait Training                                       Modalities

## 2025-01-20 ENCOUNTER — OFFICE VISIT (OUTPATIENT)
Dept: PHYSICAL THERAPY | Facility: CLINIC | Age: 75
End: 2025-01-20
Payer: MEDICARE

## 2025-01-20 DIAGNOSIS — G89.29 CHRONIC BILATERAL LOW BACK PAIN, UNSPECIFIED WHETHER SCIATICA PRESENT: Primary | ICD-10-CM

## 2025-01-20 DIAGNOSIS — M25.552 BILATERAL HIP PAIN: ICD-10-CM

## 2025-01-20 DIAGNOSIS — M25.551 BILATERAL HIP PAIN: ICD-10-CM

## 2025-01-20 DIAGNOSIS — M54.50 CHRONIC BILATERAL LOW BACK PAIN, UNSPECIFIED WHETHER SCIATICA PRESENT: Primary | ICD-10-CM

## 2025-01-20 PROCEDURE — 97110 THERAPEUTIC EXERCISES: CPT

## 2025-01-20 PROCEDURE — 97140 MANUAL THERAPY 1/> REGIONS: CPT

## 2025-01-20 PROCEDURE — 97112 NEUROMUSCULAR REEDUCATION: CPT

## 2025-01-20 NOTE — PROGRESS NOTES
"Daily Note     Today's date: 2025  Patient name: Kelli Arias  : 1950  MRN: [id number]  Referring provider: Jose Enrique Pappas MD  Dx:   Encounter Diagnosis     ICD-10-CM    1. Chronic bilateral low back pain, unspecified whether sciatica present  M54.50     G89.29       2. Bilateral hip pain  M25.551     M25.552                      Subjective: \"It's pretty good today, it's mostly stiff.\" Notes pain level is 1-2/10, across LB. Pt reports she performed HEP prior to therapy, compliant with same.      Objective: See treatment diary below      Assessment: Reviewed/performed HEP, required vc'ing during same. New exercises performed w/o complaint. Added STM and lumbar paraspinal release and B/L glute releases. Responded well to manual therapies.Tolerated treatment well. Patient would benefit from continued PT to decrease pain and increase flexibility.      Plan: Continue per plan of care.      Precautions: L4 L5 spondlylolisthesis      Manuals            Lumbar paraspinal release  +STM MO           Glute release   MO                                     Neuro Re-Ed             Isometric abdominal crunch  5\"x10           bridge HEP Reviewed 5x           Paloff press             Row  Red 15x           Shoulder ext  Red 15x           deadbug                          Ther Ex             S/l clam Red HEP Reviewed red 5x           S/l hip abduction HEP Reviewed 5x           Tail wag HEP Reviewed 5x           Cat cow HEP Reviewed 5x           Lateral band walk             HEP review Reviewed, dispense d red TB                                      Ther Activity                                       Gait Training                                       Modalities                                            "

## 2025-01-22 ENCOUNTER — OFFICE VISIT (OUTPATIENT)
Dept: PHYSICAL THERAPY | Facility: CLINIC | Age: 75
End: 2025-01-22
Payer: MEDICARE

## 2025-01-22 DIAGNOSIS — M25.552 BILATERAL HIP PAIN: ICD-10-CM

## 2025-01-22 DIAGNOSIS — M54.50 CHRONIC BILATERAL LOW BACK PAIN, UNSPECIFIED WHETHER SCIATICA PRESENT: Primary | ICD-10-CM

## 2025-01-22 DIAGNOSIS — G89.29 CHRONIC BILATERAL LOW BACK PAIN, UNSPECIFIED WHETHER SCIATICA PRESENT: Primary | ICD-10-CM

## 2025-01-22 DIAGNOSIS — M25.551 BILATERAL HIP PAIN: ICD-10-CM

## 2025-01-22 PROCEDURE — 97110 THERAPEUTIC EXERCISES: CPT

## 2025-01-22 PROCEDURE — 97112 NEUROMUSCULAR REEDUCATION: CPT

## 2025-01-22 PROCEDURE — 97140 MANUAL THERAPY 1/> REGIONS: CPT

## 2025-01-27 ENCOUNTER — APPOINTMENT (OUTPATIENT)
Dept: PHYSICAL THERAPY | Facility: CLINIC | Age: 75
End: 2025-01-27
Payer: MEDICARE

## 2025-01-28 ENCOUNTER — TELEPHONE (OUTPATIENT)
Dept: DERMATOLOGY | Facility: CLINIC | Age: 75
End: 2025-01-28

## 2025-01-28 NOTE — TELEPHONE ENCOUNTER
Called patient to advise their upcoming appointment on 2/5 with Dr. Marie needs to be rescheduled, as provider will be out of the office. Spoke with pt and appt has been r/s for tomorrow, 1/29 @ 4:30 with Dr. Marie in [facility] office.

## 2025-01-29 ENCOUNTER — OFFICE VISIT (OUTPATIENT)
Dept: DERMATOLOGY | Facility: CLINIC | Age: 75
End: 2025-01-29
Payer: MEDICARE

## 2025-01-29 ENCOUNTER — OFFICE VISIT (OUTPATIENT)
Dept: PHYSICAL THERAPY | Facility: CLINIC | Age: 75
End: 2025-01-29
Payer: MEDICARE

## 2025-01-29 VITALS — TEMPERATURE: 98.1 F

## 2025-01-29 DIAGNOSIS — M54.50 CHRONIC BILATERAL LOW BACK PAIN, UNSPECIFIED WHETHER SCIATICA PRESENT: Primary | ICD-10-CM

## 2025-01-29 DIAGNOSIS — D22.5 MULTIPLE BENIGN MELANOCYTIC NEVI OF UPPER AND LOWER EXTREMITIES AND TRUNK: ICD-10-CM

## 2025-01-29 DIAGNOSIS — M25.551 BILATERAL HIP PAIN: ICD-10-CM

## 2025-01-29 DIAGNOSIS — L81.4 LENTIGO: ICD-10-CM

## 2025-01-29 DIAGNOSIS — L57.8 OTHER SKIN CHANGES DUE TO CHRONIC EXPOSURE TO NONIONIZING RADIATION: ICD-10-CM

## 2025-01-29 DIAGNOSIS — G89.29 CHRONIC BILATERAL LOW BACK PAIN, UNSPECIFIED WHETHER SCIATICA PRESENT: Primary | ICD-10-CM

## 2025-01-29 DIAGNOSIS — M25.552 BILATERAL HIP PAIN: ICD-10-CM

## 2025-01-29 DIAGNOSIS — D22.62 MULTIPLE BENIGN MELANOCYTIC NEVI OF UPPER AND LOWER EXTREMITIES AND TRUNK: ICD-10-CM

## 2025-01-29 DIAGNOSIS — L82.1 SEBORRHEIC KERATOSES: ICD-10-CM

## 2025-01-29 DIAGNOSIS — D22.71 MULTIPLE BENIGN MELANOCYTIC NEVI OF UPPER AND LOWER EXTREMITIES AND TRUNK: ICD-10-CM

## 2025-01-29 DIAGNOSIS — L57.0 ACTINIC KERATOSIS: ICD-10-CM

## 2025-01-29 DIAGNOSIS — D22.61 MULTIPLE BENIGN MELANOCYTIC NEVI OF UPPER AND LOWER EXTREMITIES AND TRUNK: ICD-10-CM

## 2025-01-29 DIAGNOSIS — D22.72 MULTIPLE BENIGN MELANOCYTIC NEVI OF UPPER AND LOWER EXTREMITIES AND TRUNK: ICD-10-CM

## 2025-01-29 DIAGNOSIS — D18.01 CHERRY ANGIOMA: ICD-10-CM

## 2025-01-29 DIAGNOSIS — Z85.820 HISTORY OF MELANOMA: Primary | ICD-10-CM

## 2025-01-29 DIAGNOSIS — T14.8XXA ABRASION: ICD-10-CM

## 2025-01-29 PROCEDURE — 17000 DESTRUCT PREMALG LESION: CPT | Performed by: DERMATOLOGY

## 2025-01-29 PROCEDURE — 97140 MANUAL THERAPY 1/> REGIONS: CPT | Performed by: PHYSICAL THERAPIST

## 2025-01-29 PROCEDURE — 17003 DESTRUCT PREMALG LES 2-14: CPT | Performed by: DERMATOLOGY

## 2025-01-29 PROCEDURE — 97110 THERAPEUTIC EXERCISES: CPT | Performed by: PHYSICAL THERAPIST

## 2025-01-29 PROCEDURE — 97112 NEUROMUSCULAR REEDUCATION: CPT | Performed by: PHYSICAL THERAPIST

## 2025-01-29 PROCEDURE — 99214 OFFICE O/P EST MOD 30 MIN: CPT | Performed by: DERMATOLOGY

## 2025-01-29 RX ORDER — ATORVASTATIN CALCIUM 20 MG/1
TABLET, FILM COATED ORAL
COMMUNITY
Start: 2021-01-02

## 2025-01-29 NOTE — PROGRESS NOTES
"Daily Note     Today's date: 2025  Patient name: Kelli Arias  : 1950  MRN: [id number]  Referring provider: Jose Enrique Pappas MD  Dx:   Encounter Diagnosis     ICD-10-CM    1. Chronic bilateral low back pain, unspecified whether sciatica present  M54.50     G89.29       2. Bilateral hip pain  M25.551     M25.552                      Subjective: Pt reports that she is standing straighter, she has some discomfort at times still in the hips (L>R). She states she feels fatigue in the gluteus medius with her exercises. Denies soreness after PT sessions.       Objective: See treatment diary below      Assessment: Pt tolerates treatment well without significant complaints. She did experience fatigue with addition of new exercises. Pt educated that potentially will be sore from progressions. Continue with steady progressions as able to maximize function.       Plan: Continue per plan of care.      Precautions: L4 L5 spondlylolisthesis      Manuals          Lumbar paraspinal release  +STM MO +STM MO DL         Glute release   MO MO L DL pin/stretch                                   Neuro Re-Ed             Isometric abdominal crunch  5\"x10 5\"x10 5\"x10         bridge HEP Reviewed 5x 5\" 2x10 5\" 2x10                      Paloff press    NV double Red         Row  Red 15x Red  2x10 Black 3x10         Shoulder ext  Red 15x Red 2x10 Grn 3x10         deadbug                          Ther Ex             S/l clam Red HEP Reviewed red 5x Red 2x10  B/L Red 2x10 B/L         S/l hip abduction HEP Reviewed 5x 2x10  B/L 2x10 B/L         Tail wag HEP Reviewed 5x 15x 15x ea         Cat cow HEP Reviewed 5x 15x ea 15x ea         Lateral band walk   NV Grn 3 laps         Fire hydrant     Stand 2x10 ea.          Hamstring curl    5# 3x10          HEP review Reviewed, dispense d red TB                                      Ther Activity                                       Gait Training                        "                Modalities

## 2025-01-29 NOTE — PROGRESS NOTES
"[facility]ermatology Clinic Note     Patient Name: Kelli Arias  Encounter Date: 1/29/25     Have you been cared for by a [facility] Dermatologist in the last 3 years and, if so, which description applies to you?    Yes.  I have been here within the last 3 years, and my medical history has NOT changed since that time.  I am FEMALE/of child-bearing potential.    REVIEW OF SYSTEMS:  Have you recently had or currently have any of the following? No changes in my recent health.   PAST MEDICAL HISTORY:  Have you personally ever had or currently have any of the following?  If \"YES,\" then please provide more detail. No changes in my medical history.   HISTORY OF IMMUNOSUPPRESSION: Do you have a history of any of the following:  Systemic Immunosuppression such as Diabetes, Biologic or Immunotherapy, Chemotherapy, Organ Transplantation, Bone Marrow Transplantation or Prednisone?  No     Answering \"YES\" requires the addition of the dotphrase \"IMMUNOSUPPRESSED\" as the first diagnosis of the patient's visit.   FAMILY HISTORY:  Any \"first degree relatives\" (parent, brother, sister, or child) with the following?    No changes in my family's known health.   PATIENT EXPERIENCE:    Do you want the Dermatologist to perform a COMPLETE skin exam today including a clinical examination under the \"bra and underwear\" areas?  Yes  If necessary, do we have your permission to call and leave a detailed message on your Preferred Phone number that includes your specific medical information?  Yes      Allergies   Allergen Reactions    Other Other (See Comments)     Scallops - vomiting      Erythromycin GI Intolerance     Abdominal cramping      Current Outpatient Medications:     Ascorbic Acid (VITAMIN C) 1000 MG tablet, Take 1,000 mg by mouth daily., Disp: , Rfl:     atorvastatin (LIPITOR) 20 mg tablet, TAKE 1 TABLET BY MOUTH EVERY DAY AT NIGHT, Disp: , Rfl:     levothyroxine 50 mcg tablet, Take 50 mcg by mouth daily., Disp: , Rfl:     " Multiple Vitamin (MULTIVITAMIN) tablet, Take 1 tablet by mouth daily., Disp: , Rfl:     omega-3-acid ethyl esters (LOVAZA) 1 g capsule, Take 2 g by mouth daily., Disp: , Rfl:     VITAMIN D, CHOLECALCIFEROL, PO, Take 2,100 Units by mouth daily., Disp: , Rfl:     ketoconazole (NIZORAL) 2 % cream, Apply topically daily, Disp: 30 g, Rfl: 2    travoprost (TRAVATAN Z) 0.004 % ophthalmic solution, Administer 1 drop to both eyes daily at bedtime., Disp: , Rfl:           Whom besides the patient is providing clinical information about today's encounter?   NO ADDITIONAL HISTORIAN (patient alone provided history)    Physical Exam and Assessment/Plan by Diagnosis:    HISTORY OF MELANOMA     Physical Exam:  Anatomic Location Affected:  back  Morphological Description of Scar:  Well healed   Year Treated: 2009  TNM Classification: Unsure noted they only did excision treatment   Suspected Recurrence: no  Regional adenopathy: no     Additional History of Present Condition:  Patient states MM was excised in 2009      Assessment and Plan:  Based on a thorough discussion of this condition and the management approach to it (including a comprehensive discussion of the known risks, side effects and potential benefits of treatment), the patient (family) agrees to implement the following specific plan:  Sun protection  Sunscreen spf 50 higher   Skin check 6 months        Abrasion  Physical Exam:  Anatomic Location Affected:  right lower leg  Morphological Description:  abrasion     Additional History of Present Condition:  pt has a spot that has recently opened and is itchy. It had recently happened 2 or 3 days ago.    Assessment and Plan:  Based on a thorough discussion of this condition and the management approach to it (including a comprehensive discussion of the known risks, side effects and potential benefits of treatment), the patient (family) agrees to implement the following specific plan:  Start Mupirocin ointment two to three  "times a day until it heals      CHERRY ANGIOMAS     Physical Exam:  Anatomic Location Affected:  Trunk and extremities  Morphological Description:  Scattered cherry red papules  Denies pain, itch, bleeding. No treatments tried. Present for years. Present constantly; no modifying factors which make it worse or better.     Assessment and Plan:  Based on a thorough discussion of this condition and the management approach to it (including a comprehensive discussion of the known risks, side effects and potential benefits of treatment), the patient (family) agrees to implement the following specific plan:  Reassure benign        SEBORRHEIC KERATOSIS; NON-INFLAMED     Physical Exam:  Anatomic Location Affected:  Trunk and extremities  Morphological Description:  Waxy, smooth to warty textured, yellow to brownish-grey to dark brown to blackish, discrete, \"stuck-on\" appearing papules.  Present for years. Denies pain, itch, bleeding.      Additional History of Present Condition:  Present constantly; no modifying factors which make it worse or better. No prior treatment.       Assessment and Plan:  Based on a thorough discussion of this condition and the management approach to it (including a comprehensive discussion of the known risks, side effects and potential benefits of treatment), the patient (family) agrees to implement the following specific plan:  Reassure benign  Use sun protection.  Apply SPF 30 or higher at least three times a day.  Wear sun protecting clothing and hats.        SOLAR LENTIGINES   OTHER SKIN CHANGES DUE TO CHRONIC EXPOSURE TO NONIONIZING RADIATION     Physical Exam:  Anatomic Location Affected:  Sun exposed areas of back, chest, arms, legs  Morphological Description:  Multiple scattered brown to tan evenly pigmented macules   Denies pain, itch, bleeding. No treatments tried. Present for months - years. Reports getting newer lesions with sun exposure.         Assessment and Plan:  Based on a thorough " "discussion of this condition and the management approach to it (including a comprehensive discussion of the known risks, side effects and potential benefits of treatment), the patient (family) agrees to implement the following specific plan:  Reassure benign  Use sun protection.  Apply SPF 30 or higher at least three times a day.  Wear sun protecting clothing and hats.         MULTIPLE MELANOCYTIC NEVI (\"Moles\")     Physical Exam:  Anatomic Location Affected: Trunk and extremities  Morphological Description:  Scattered, round to ovoid, symmetrical-appearing, even bordered, skin colored to dark brown macules/papules  Denies pain, itch, bleeding. No treatments tried. Present for years. Present constantly; no modifying factors which make it worse or better. Denies actively changing or growing moles.      Assessment and Plan:  Based on a thorough discussion of this condition and the management approach to it (including a comprehensive discussion of the known risks, side effects and potential benefits of treatment), the patient (family) agrees to implement the following specific plan:  Reassure benign  Monitor for changes  Use sun protection.  Apply SPF 30 or higher at least three times a day.  Wear sun protecting clothing and hats.    ACTINIC KERATOSES  Physical Exam:  Anatomic Location Affected:  right forearms, right dorsal hand  Morphological Description:  Thin pink papule(s) with gritty scale       Assessment and Plan:  Based on a thorough discussion of this condition and the management approach to it (including a comprehensive discussion of the known risks, side effects and potential benefits of treatment), the patient (family) agrees to implement the following specific plan:  Treated with cryotherapy today; written and verbal consent obtained     PROCEDURE:  DESTRUCTION OF PRE-MALIGNANT LESIONS  After a thorough discussion of treatment options and risk/benefits/alternatives (including but not limited to local " pain, scarring, dyspigmentation, blistering, and possible superinfection), verbal and written consent were obtained and the aforementioned lesions were treated on with cryotherapy using liquid nitrogen x 2 cycles for 5-10 seconds. The patient tolerated the procedure well, and after-care instructions were provided.     TOTAL NUMBER of 3 pre-malignant lesions were treated today on the ANATOMIC LOCATION: right forearms, right dorsal hand.       Patient instructions:  Your pre-cancerous lesions (called actinic keratosis) were treated with liquid nitrogen today. The treated areas will get more red, crusted over the next few days. There might be some blistering. Apply vaseline to the treated area for the next week to help it heal fully. Do not pick at the area. Return in 3-4 weeks for another round of liquid nitrogen treatment if lesion(s)  fails to fully resolve.     Worrisome signs of skin malignancy discussed, questions answered. Regular self-skin check discussed. Advised to call or return to office if patient notices any spots of concern, rapidly growing/changing lesions, bleeding lesions, non-healing lesions. Advised regular SPF use.     Scribe Attestation      I,:  Mckenna Duggan MA am acting as a scribe while in the presence of the attending physician.:       I,:  Raman Marie MD personally performed the services described in this documentation    as scribed in my presence.:

## 2025-01-30 RX ORDER — MUPIROCIN 20 MG/G
OINTMENT TOPICAL 2 TIMES DAILY
Qty: 30 G | Refills: 0 | Status: SHIPPED | OUTPATIENT
Start: 2025-01-30

## 2025-01-31 ENCOUNTER — OFFICE VISIT (OUTPATIENT)
Dept: PHYSICAL THERAPY | Facility: CLINIC | Age: 75
End: 2025-01-31
Payer: MEDICARE

## 2025-01-31 DIAGNOSIS — G89.29 CHRONIC BILATERAL LOW BACK PAIN, UNSPECIFIED WHETHER SCIATICA PRESENT: Primary | ICD-10-CM

## 2025-01-31 DIAGNOSIS — M25.551 BILATERAL HIP PAIN: ICD-10-CM

## 2025-01-31 DIAGNOSIS — M25.552 BILATERAL HIP PAIN: ICD-10-CM

## 2025-01-31 DIAGNOSIS — M54.50 CHRONIC BILATERAL LOW BACK PAIN, UNSPECIFIED WHETHER SCIATICA PRESENT: Primary | ICD-10-CM

## 2025-01-31 PROCEDURE — 97140 MANUAL THERAPY 1/> REGIONS: CPT

## 2025-01-31 PROCEDURE — 97112 NEUROMUSCULAR REEDUCATION: CPT

## 2025-01-31 PROCEDURE — 97110 THERAPEUTIC EXERCISES: CPT

## 2025-01-31 NOTE — PROGRESS NOTES
"Daily Note     Today's date: 2025  Patient name: Kelli Arias  : 1950  MRN: [id number]  Referring provider: Jose Enrique Pappas MD  Dx:   Encounter Diagnosis     ICD-10-CM    1. Chronic bilateral low back pain, unspecified whether sciatica present  M54.50     G89.29       2. Bilateral hip pain  M25.551     M25.552                      Subjective: Pt reports she is feeling better, she is not feeling as tender in her L LB and glute.      Objective: See treatment diary below      Assessment: Performed exercise program w/o difficulty or discomfort. Required minimal vc'ing during same. Responded well to manual therapies. Tolerated treatment well. Will monitor. Patient would benefit from continued PT to decrease pain and increase mobility.      Plan: Continue per plan of care.      Precautions: L4 L5 spondlylolisthesis      Manuals         Lumbar paraspinal release  +STM MO +STM MO DL MO        Glute release   MO MO L DL pin/stretch MO                                  Neuro Re-Ed             Isometric abdominal crunch  5\"x10 5\"x10 5\"x10 5\"x10        bridge HEP Reviewed 5x 5\" 2x10 5\" 2x10 5\" 2x10                     Paloff press    NV double Red Double red 10x ea        Row  Red 15x Red  2x10 Black 3x10 Black 3x10        Shoulder ext  Red 15x Red 2x10 Grn 3x10 Grn 3x10        deadbug                          Ther Ex             S/l clam Red HEP Reviewed red 5x Red 2x10  B/L Red 2x10 B/L Red 2x10 B/L        S/l hip abduction HEP Reviewed 5x 2x10  B/L 2x10 B/L 2x10  B/L        Tail wag HEP Reviewed 5x 15x 15x ea 15x  ea        Cat cow HEP Reviewed 5x 15x ea 15x ea 15x  ea        Lateral band walk   NV Grn 3 laps Grn 3 laps        Fire hydrant     Stand 2x10 ea.  Stand 2x10        Hamstring curl    5# 3x10  5# 3x10        HEP review Reviewed, dispense d red TB                                      Ther Activity                                       Gait Training                           "             Modalities

## 2025-02-03 ENCOUNTER — OFFICE VISIT (OUTPATIENT)
Dept: PHYSICAL THERAPY | Facility: CLINIC | Age: 75
End: 2025-02-03
Payer: MEDICARE

## 2025-02-03 DIAGNOSIS — M54.50 CHRONIC BILATERAL LOW BACK PAIN, UNSPECIFIED WHETHER SCIATICA PRESENT: Primary | ICD-10-CM

## 2025-02-03 DIAGNOSIS — M25.551 BILATERAL HIP PAIN: ICD-10-CM

## 2025-02-03 DIAGNOSIS — G89.29 CHRONIC BILATERAL LOW BACK PAIN, UNSPECIFIED WHETHER SCIATICA PRESENT: Primary | ICD-10-CM

## 2025-02-03 DIAGNOSIS — M25.552 BILATERAL HIP PAIN: ICD-10-CM

## 2025-02-03 PROCEDURE — 97112 NEUROMUSCULAR REEDUCATION: CPT

## 2025-02-03 PROCEDURE — 97110 THERAPEUTIC EXERCISES: CPT

## 2025-02-03 PROCEDURE — 97140 MANUAL THERAPY 1/> REGIONS: CPT

## 2025-02-03 NOTE — PROGRESS NOTES
"Daily Note     Today's date: 2/3/2025  Patient name: Kelli Arias  : 1950  MRN: [id number]  Referring provider: Jose Enrique Pappas MD  Dx:   Encounter Diagnosis     ICD-10-CM    1. Chronic bilateral low back pain, unspecified whether sciatica present  M54.50     G89.29       2. Bilateral hip pain  M25.551     M25.552                      Subjective: Pt reports her LB and hips are bothering her today, adding she was sitting too much over the weekend. Notes she is feeling better overall.      Objective: See treatment diary below      Assessment: Performed new exercise and remaining ex program w/o complaint. Required minimal vc'ing during same. Responded well to manual therapies, Tolerated treatment well. Will monitor. Patient would benefit from continued PT to decrease pain, improve flexibility and LOF.      Plan: Continue per plan of care.      Precautions: L4 L5 spondlylolisthesis      Manuals  2/3       Lumbar paraspinal release  +STM MO +STM MO DL MO MO       Glute release   MO MO L DL pin/stretch MO MO                                 Neuro Re-Ed             Isometric abdominal crunch  5\"x10 5\"x10 5\"x10 5\"x10 5\"x10       bridge HEP Reviewed 5x 5\" 2x10 5\" 2x10 5\" 2x10 5\"  2x10                    Paloff press    NV double Red Double red 10x ea Double red 10x ea       Row  Red 15x Red  2x10 Black 3x10 Black 3x10 Black 3x10       Shoulder ext  Red 15x Red 2x10 Grn 3x10 Grn 3x10 Grn 3x10       deadbug      10x                    Ther Ex             S/l clam Red HEP Reviewed red 5x Red 2x10  B/L Red 2x10 B/L Red 2x10 B/L Red 2x10 B/L       S/l hip abduction HEP Reviewed 5x 2x10  B/L 2x10 B/L 2x10  B/L 2x10 B/L       Tail wag HEP Reviewed 5x 15x 15x ea 15x  ea 15x ea       Cat cow HEP Reviewed 5x 15x ea 15x ea 15x  ea 15x ea       Lateral band walk   NV Grn 3 laps Grn 3 laps Grn 3 laps       Fire hydrant     Stand 2x10 ea.  Stand 2x10 Stand 2x10       Hamstring curl    5# 3x10  5# 3x10 " 5# 3x10       HEP review Reviewed, elif de [place] red TB                                      Ther Activity                                       Gait Training                                       Modalities

## 2025-02-05 ENCOUNTER — OFFICE VISIT (OUTPATIENT)
Dept: PHYSICAL THERAPY | Facility: CLINIC | Age: 75
End: 2025-02-05
Payer: MEDICARE

## 2025-02-05 DIAGNOSIS — M25.552 BILATERAL HIP PAIN: ICD-10-CM

## 2025-02-05 DIAGNOSIS — M54.50 CHRONIC BILATERAL LOW BACK PAIN, UNSPECIFIED WHETHER SCIATICA PRESENT: Primary | ICD-10-CM

## 2025-02-05 DIAGNOSIS — G89.29 CHRONIC BILATERAL LOW BACK PAIN, UNSPECIFIED WHETHER SCIATICA PRESENT: Primary | ICD-10-CM

## 2025-02-05 DIAGNOSIS — M25.551 BILATERAL HIP PAIN: ICD-10-CM

## 2025-02-05 PROCEDURE — 97140 MANUAL THERAPY 1/> REGIONS: CPT

## 2025-02-05 PROCEDURE — 97110 THERAPEUTIC EXERCISES: CPT

## 2025-02-05 PROCEDURE — 97112 NEUROMUSCULAR REEDUCATION: CPT

## 2025-02-05 NOTE — PROGRESS NOTES
"Daily Note     Today's date: 2025  Patient name: Kelli Arias  : 1950  MRN: [id number]  Referring provider: Jose Enrique Pappas MD  Dx:   Encounter Diagnosis     ICD-10-CM    1. Chronic bilateral low back pain, unspecified whether sciatica present  M54.50     G89.29       2. Bilateral hip pain  M25.551     M25.552                      Subjective: Pt cont to note improvement in her LB and B hips      Objective: See treatment diary below      Assessment: Performed exercise progression and remaining ex program w/o difficulty or discomfort. Responded well to manual therapies. Good relief after tx. Tolerated treatment well. Patient would benefit from continued PT to decrease pain, improve flexibility and LOF.      Plan: Continue per plan of care.      Precautions: L4 L5 spondlylolisthesis      Manuals 1/16 1/20 1/22 1/29 1/31 2/3 2      Lumbar paraspinal release  +STM MO +STM MO DL MO MO MO      Glute release   MO MO L DL pin/stretch MO MO MO                                Neuro Re-Ed             Isometric abdominal crunch  5\"x10 5\"x10 5\"x10 5\"x10 5\"x10 5\"x12  Pball press      bridge HEP Reviewed 5x 5\" 2x10 5\" 2x10 5\" 2x10 5\"  2x10 5\" 2x10                   Paloff press    NV double Red Double red 10x ea Double red 10x ea Double red 10x ea      Row  Red 15x Red  2x10 Black 3x10 Black 3x10 Black 3x10 Black 3x10      Shoulder ext  Red 15x Red 2x10 Grn 3x10 Grn 3x10 Grn 3x10 Grn 3x10      deadbug      10x 10x heel taps                   Ther Ex             S/l clam Red HEP Reviewed red 5x Red 2x10  B/L Red 2x10 B/L Red 2x10 B/L Red 2x10 B/L Red 2x10 B/L      S/l hip abduction HEP Reviewed 5x 2x10  B/L 2x10 B/L 2x10  B/L 2x10 B/L 2x10 B/L      Tail wag HEP Reviewed 5x 15x 15x ea 15x  ea 15x ea 15x  ea      Cat cow HEP Reviewed 5x 15x ea 15x ea 15x  ea 15x ea 15x  ea      Lateral band walk   NV Grn 3 laps Grn 3 laps Grn 3 laps Grn 3 laps      Fire hydrant     Stand 2x10 ea.  Stand 2x10 Stand 2x10 Stand 2x10   "    Hamstring curl    5# 3x10  5# 3x10 5# 3x10 5# 3x10      HEP review Reviewed, dispense d red TB                                      Ther Activity                                       Gait Training                                       Modalities

## 2025-02-06 ENCOUNTER — APPOINTMENT (OUTPATIENT)
Dept: PHYSICAL THERAPY | Facility: CLINIC | Age: 75
End: 2025-02-06
Payer: MEDICARE

## 2025-02-10 ENCOUNTER — OFFICE VISIT (OUTPATIENT)
Dept: PHYSICAL THERAPY | Facility: CLINIC | Age: 75
End: 2025-02-10
Payer: MEDICARE

## 2025-02-10 DIAGNOSIS — G89.29 CHRONIC BILATERAL LOW BACK PAIN, UNSPECIFIED WHETHER SCIATICA PRESENT: Primary | ICD-10-CM

## 2025-02-10 DIAGNOSIS — M25.551 BILATERAL HIP PAIN: ICD-10-CM

## 2025-02-10 DIAGNOSIS — M25.552 BILATERAL HIP PAIN: ICD-10-CM

## 2025-02-10 DIAGNOSIS — M54.50 CHRONIC BILATERAL LOW BACK PAIN, UNSPECIFIED WHETHER SCIATICA PRESENT: Primary | ICD-10-CM

## 2025-02-10 PROCEDURE — 97110 THERAPEUTIC EXERCISES: CPT

## 2025-02-10 PROCEDURE — 97140 MANUAL THERAPY 1/> REGIONS: CPT

## 2025-02-10 PROCEDURE — 97112 NEUROMUSCULAR REEDUCATION: CPT

## 2025-02-10 NOTE — PROGRESS NOTES
"Daily Note     Today's date: 2/10/2025  Patient name: Kelli Arias  : 1950  MRN: [id number]  Referring provider: Jose Enrique Pappas MD  Dx:   Encounter Diagnosis     ICD-10-CM    1. Chronic bilateral low back pain, unspecified whether sciatica present  M54.50     G89.29       2. Bilateral hip pain  M25.551     M25.552                      Subjective: Pt states she feels \"pretty good,\" adding \"I'm stiff in the morning.\"      Objective: See treatment diary below      Assessment: Performed exercise program w/o complaint. Required minimal vc'ing during same. Cont to respond well to manual therapies as below. Tolerated treatment well. Patient would benefit from continued PT to decrease pain, increase flexibility and LOF.      Plan: Continue per plan of care.      Precautions: L4 L5 spondlylolisthesis      Manuals 1/16 1/20 1/22 1/29 1/31 2/3 2/5 2/10     Lumbar paraspinal release  +STM MO +STM MO DL MO MO MO MO     Glute release   MO MO L DL pin/stretch MO MO MO MO                               Neuro Re-Ed             Isometric abdominal crunch  5\"x10 5\"x10 5\"x10 5\"x10 5\"x10 5\"x12  Pball press 5\"x12 pball press     bridge HEP Reviewed 5x 5\" 2x10 5\" 2x10 5\" 2x10 5\"  2x10 5\" 2x10 5\" 2x10                  Paloff press    NV double Red Double red 10x ea Double red 10x ea Double red 10x ea Double red 10x ea     Row  Red 15x Red  2x10 Black 3x10 Black 3x10 Black 3x10 Black 3x10 Black  3x10     Shoulder ext  Red 15x Red 2x10 Grn 3x10 Grn 3x10 Grn 3x10 Grn 3x10 Grn 3x10     deadbug      10x 10x heel taps 10x heel taps                  Ther Ex             S/l clam Red HEP Reviewed red 5x Red 2x10  B/L Red 2x10 B/L Red 2x10 B/L Red 2x10 B/L Red 2x10 B/L Grn 2x10 B/L     S/l hip abduction HEP Reviewed 5x 2x10  B/L 2x10 B/L 2x10  B/L 2x10 B/L 2x10 B/L 2x10 B/L     Tail wag HEP Reviewed 5x 15x 15x ea 15x  ea 15x ea 15x  ea 15x ea     Cat cow HEP Reviewed 5x 15x ea 15x ea 15x  ea 15x ea 15x  ea 15x ea     Lateral band walk "   NV Grn 3 laps Grn 3 laps Grn 3 laps Grn 3 laps Grn 3 laps     Fire hydrant     Stand 2x10 ea.  Stand 2x10 Stand 2x10 Stand 2x10 Stand 2x10      Hamstring curl    5# 3x10  5# 3x10 5# 3x10 5# 3x10 5# 3x10     HEP review Reviewed, dispense d red TB                                      Ther Activity                                       Gait Training                                       Modalities

## 2025-02-12 ENCOUNTER — OFFICE VISIT (OUTPATIENT)
Dept: PHYSICAL THERAPY | Facility: CLINIC | Age: 75
End: 2025-02-12
Payer: MEDICARE

## 2025-02-12 DIAGNOSIS — M25.552 BILATERAL HIP PAIN: ICD-10-CM

## 2025-02-12 DIAGNOSIS — M25.551 BILATERAL HIP PAIN: ICD-10-CM

## 2025-02-12 DIAGNOSIS — M54.50 CHRONIC BILATERAL LOW BACK PAIN, UNSPECIFIED WHETHER SCIATICA PRESENT: Primary | ICD-10-CM

## 2025-02-12 DIAGNOSIS — G89.29 CHRONIC BILATERAL LOW BACK PAIN, UNSPECIFIED WHETHER SCIATICA PRESENT: Primary | ICD-10-CM

## 2025-02-12 PROCEDURE — 97140 MANUAL THERAPY 1/> REGIONS: CPT | Performed by: PHYSICAL THERAPIST

## 2025-02-12 PROCEDURE — 97110 THERAPEUTIC EXERCISES: CPT | Performed by: PHYSICAL THERAPIST

## 2025-02-12 PROCEDURE — 97112 NEUROMUSCULAR REEDUCATION: CPT | Performed by: PHYSICAL THERAPIST

## 2025-02-12 NOTE — PROGRESS NOTES
"Daily Note     Today's date: 2025  Patient name: Kelli Arias  : 1950  MRN: [id number]  Referring provider: Jose Enrique Pappas MD  Dx:   Encounter Diagnosis     ICD-10-CM    1. Chronic bilateral low back pain, unspecified whether sciatica present  M54.50     G89.29       2. Bilateral hip pain  M25.551     M25.552                      Subjective: Pt reports that she is stiff upon arrival to PT. She states feeling that weather affects her pain.       Objective: See treatment diary below      Assessment: Pt continues to tolerate progressions well, mild soreness in the glute muscles with exercises which is expected. Pt will benefit from continued skilled PT.       Plan: Continue per plan of care.      Precautions: L4 L5 spondlylolisthesis      Manuals 1/16 1/20 1/22 1/29 1/31 2/3 2/5 2/10 2/12    Lumbar paraspinal release  +STM MO +STM MO DL MO MO MO MO DL    Glute release   MO MO L DL pin/stretch MO MO MO MO DL                              Neuro Re-Ed             Isometric abdominal crunch  5\"x10 5\"x10 5\"x10 5\"x10 5\"x10 5\"x12  Pball press 5\"x12 pball press 5\"x12 pball press    bridge HEP Reviewed 5x 5\" 2x10 5\" 2x10 5\" 2x10 5\"  2x10 5\" 2x10 5\" 2x10 Pball 5\" 2x10                 Paloff press    NV double Red Double red 10x ea Double red 10x ea Double red 10x ea Double red 10x ea Double red 10x ea.     Row  Red 15x Red  2x10 Black 3x10 Black 3x10 Black 3x10 Black 3x10 Black  3x10 Black 3x12    Shoulder ext  Red 15x Red 2x10 Grn 3x10 Grn 3x10 Grn 3x10 Grn 3x10 Grn 3x10 Blue 3x10    deadbug      10x 10x heel taps 10x heel taps 10x heel taps                 Ther Ex             S/l clam Red HEP Reviewed red 5x Red 2x10  B/L Red 2x10 B/L Red 2x10 B/L Red 2x10 B/L Red 2x10 B/L Grn 2x10 B/L Grn 2x10 B/L    S/l hip abduction HEP Reviewed 5x 2x10  B/L 2x10 B/L 2x10  B/L 2x10 B/L 2x10 B/L 2x10 B/L 2x10 B/L    Tail wag HEP Reviewed 5x 15x 15x ea 15x  ea 15x ea 15x  ea 15x ea 15x ea    Cat cow HEP Reviewed 5x 15x ea " 15x ea 15x  ea 15x ea 15x  ea 15x ea 15x ea    Lateral band walk   NV Grn 3 laps Grn 3 laps Grn 3 laps Grn 3 laps Grn 3 laps Grn 4 laps    Fire hydrant     Stand 2x10 ea.  Stand 2x10 Stand 2x10 Stand 2x10 Stand 2x10  Stand 2x10    Hamstring curl    5# 3x10  5# 3x10 5# 3x10 5# 3x10 5# 3x10 5# 3x10 ea    HEP review Reviewed, dispense d red TB                                      Ther Activity                                       Gait Training                                       Modalities

## 2025-02-17 ENCOUNTER — EVALUATION (OUTPATIENT)
Dept: PHYSICAL THERAPY | Facility: CLINIC | Age: 75
End: 2025-02-17
Payer: MEDICARE

## 2025-02-17 DIAGNOSIS — G89.29 CHRONIC BILATERAL LOW BACK PAIN, UNSPECIFIED WHETHER SCIATICA PRESENT: Primary | ICD-10-CM

## 2025-02-17 DIAGNOSIS — M25.552 BILATERAL HIP PAIN: ICD-10-CM

## 2025-02-17 DIAGNOSIS — M54.50 CHRONIC BILATERAL LOW BACK PAIN, UNSPECIFIED WHETHER SCIATICA PRESENT: Primary | ICD-10-CM

## 2025-02-17 DIAGNOSIS — M25.551 BILATERAL HIP PAIN: ICD-10-CM

## 2025-02-17 PROCEDURE — 97110 THERAPEUTIC EXERCISES: CPT | Performed by: PHYSICAL THERAPIST

## 2025-02-17 PROCEDURE — 97112 NEUROMUSCULAR REEDUCATION: CPT | Performed by: PHYSICAL THERAPIST

## 2025-02-17 PROCEDURE — 97164 PT RE-EVAL EST PLAN CARE: CPT | Performed by: PHYSICAL THERAPIST

## 2025-02-17 NOTE — PROGRESS NOTES
Re-evaluation    Today's date: 2025  Patient name: Kelli Arias  : 1950  MRN: [id number]  Referring provider: Jose Enrique Pappas MD  Dx:   Encounter Diagnosis     ICD-10-CM    1. Chronic bilateral low back pain, unspecified whether sciatica present  M54.50     G89.29       2. Bilateral hip pain  M25.551     M25.552                      Assessment  Impairments: abnormal gait, abnormal or restricted ROM, impaired physical strength, lacks appropriate home exercise program and pain with function  Symptom irritability: low    Assessment details: Pt is a 74 y.o. female presenting to PT with chronic low back pain and B hip pain. Pt has been consistent with PT, having completed 10 visits of PT. She has significant improvement in hip strength which translates to decreased pain overall and FOTO improvements. However she does report continued stiffness in the L hip and pain. She was educated that at this time, she may continue to progressively strengthen. She presents with signs/symptoms of gluteal tendinopathy, although cannot rule out bursitis or lumbar component. Pt will benefit from 1 additional appointment to review self progressions and HEP review. Did advise that as she persists with pain, she may be appropriate for MRI to investigate further causes of pain.   Understanding of Dx/Px/POC: good     Prognosis: good    Goals  1. Pt will demonstrate understanding of HEP and POC in order to improve compliance with course of rehab in 2 weeks. (MET)  2. Pt will ambulate with minimal gait deviations in 4 weeks. (MET)  3. Pt's hip ER/ABD MMT will improve by at least 1 grade to decrease hip loads with ambulation by d/c. (Ongoing)  4. Pt's pain will be 2/10 or less to allow pt to return to PLOF with least restriction by d/c. (Ongoing)    Plan  Patient would benefit from: skilled physical therapy    Planned therapy interventions: joint mobilization, manual therapy, neuromuscular re-education, strengthening,  stretching, therapeutic activities, therapeutic exercise, home exercise program, functional ROM exercises and flexibility    Frequency: 2x week  Duration in weeks: 8  Treatment plan discussed with: patient      Subjective Evaluation    History of Present Illness  Mechanism of injury: Pt arrives to PT with chief complaints of low back stiffness and B hip stiffness (L>R). She states having treatment for herniated disc decades ago. She has x-ray findings of spondylolisthesis but this is well managed with her own exercises involving strengthening and stretching. Pain level is low however primarily stiffness is primary complaint. with findings of L4, L5.    She has pain relief with lying on bed, pain with long duration standing, bending and lifting.      : Pt reports that there are days in which she has minimal to no pain, yesterday she did not notice any stiffness or pain throughout the entire day. Her primary complaint remains L hip stiffness in the morning that improves throughout the day. She overall reports improvement. She states that she does not have follow up with physiatrist.     Quality of life: good    Patient Goals  Patient goals for therapy: decreased pain, increased motion, increased strength and independence with ADLs/IADLs    Pain  Current pain ratin  At best pain ratin  At worst pain rating: 3  Location: low back, B pain  Quality: tight, discomfort and dull ache          Objective    Flowsheet Rows      Flowsheet Row Most Recent Value   PT/OT G-Codes    Current Score 64   Projected Score 69            Lumbar ROM:  Flex: WNL  Ext: WNL  SB: symmetrical and normal B     Core stability:  Good posterior pelvic tilt  Fair stability with abdominals in sagittal plane and transverse plane    Hip strength:  Hip abd: 4/5; 4/5  Hip ER: 4/5; 4/5  Hip Ext: 3+/5; 3+/5    Hip ROM:  Flex: 115° B  Hip ER: 60° B  Hip IR: 20° B (slight glute discomfort B)    Hip special tests:  Scour negative  Femoral  "impingement negative  LYLY: negative    Palpation:  Lumbar paraspinal soft tissue tension and tenderness B  Glute med/max tension B (slight tenderness/recreation of pain)    Joint mobility:  Lumbar PA: L1-S1 normal  Lumbar UPA: L1/L2-L5/S1 normal B         Plan: Continue per plan of care.      Precautions: L4 L5 spondlylolisthesis      Manuals 1/16 1/20 1/22 1/29 1/31 2/3 2/5 2/10 2/12 2/17   Lumbar paraspinal release  +STM MO +STM MO DL MO MO MO MO DL DL   Glute release   MO MO L DL pin/stretch MO MO MO MO DL DL                             Neuro Re-Ed             Isometric abdominal crunch  5\"x10 5\"x10 5\"x10 5\"x10 5\"x10 5\"x12  Pball press 5\"x12 pball press 5\"x12 pball press 5\"x12 pball press   bridge HEP Reviewed 5x 5\" 2x10 5\" 2x10 5\" 2x10 5\"  2x10 5\" 2x10 5\" 2x10 Pball 5\" 2x10 Pball 5\" 2x10                Paloff press    NV double Red Double red 10x ea Double red 10x ea Double red 10x ea Double red 10x ea Double red 10x ea.  Double red 10x ea.    Row  Red 15x Red  2x10 Black 3x10 Black 3x10 Black 3x10 Black 3x10 Black  3x10 Black 3x12 Black 3x15   Shoulder ext  Red 15x Red 2x10 Grn 3x10 Grn 3x10 Grn 3x10 Grn 3x10 Grn 3x10 Blue 3x10 Blue 3x10   deadbug      10x 10x heel taps 10x heel taps 10x heel taps 10x heel taps                Ther Ex             S/l clam Red HEP Reviewed red 5x Red 2x10  B/L Red 2x10 B/L Red 2x10 B/L Red 2x10 B/L Red 2x10 B/L Grn 2x10 B/L Grn 2x10 B/L Review (Grn or blue)   S/l hip abduction HEP Reviewed 5x 2x10  B/L 2x10 B/L 2x10  B/L 2x10 B/L 2x10 B/L 2x10 B/L 2x10 B/L 2x10 B/L   Tail wag HEP Reviewed 5x 15x 15x ea 15x  ea 15x ea 15x  ea 15x ea 15x ea    Cat cow HEP Reviewed 5x 15x ea 15x ea 15x  ea 15x ea 15x  ea 15x ea 15x ea    Lateral band walk   NV Grn 3 laps Grn 3 laps Grn 3 laps Grn 3 laps Grn 3 laps Grn 4 laps Grn 4 laps   Fire hydrant     Stand 2x10 ea.  Stand 2x10 Stand 2x10 Stand 2x10 Stand 2x10  Stand 2x10 Stand 2x10    Hamstring curl    5# 3x10  5# 3x10 5# 3x10 5# 3x10 5# 3x10 " 5# 3x10 ea 5# 3x10 ea.    Tests/measures          See obj.    HEP review Reviewed, dispense d red TB                                      Ther Activity                                       Gait Training                                       Modalities

## 2025-02-17 NOTE — LETTER
2025    Jose Enrique Pappas MD  [address]  [address] [address]    Patient: Kelli Arias   YOB: 1950   Date of Visit: 2025     Encounter Diagnosis     ICD-10-CM    1. Chronic bilateral low back pain, unspecified whether sciatica present  M54.50     G89.29       2. Bilateral hip pain  M25.551     M25.552           Dear Dr. Pappas:    Thank you for your recent referral of Kelli Arias. Please review the attached evaluation summary from Kelli's recent visit.     Please verify that you agree with the plan of care by signing the attached order.     If you have any questions or concerns, please do not hesitate to call.     I sincerely appreciate the opportunity to share in the care of one of your patients and hope to have another opportunity to work with you in the near future.       Sincerely,    Román Wetzel, PT      Referring Provider:      I certify that I have read the below Plan of Care and certify the need for these services furnished under this plan of treatment while under my care.                    Jose Enrique Pappas MD  [address]  [address] [address]  Via Fax: [phone]          Re-evaluation    Today's date: 2025  Patient name: Kelli Arias  : 1950  MRN: [id number]  Referring provider: Jose Enrique Pappas MD  Dx:   Encounter Diagnosis     ICD-10-CM    1. Chronic bilateral low back pain, unspecified whether sciatica present  M54.50     G89.29       2. Bilateral hip pain  M25.551     M25.552                      Assessment  Impairments: abnormal gait, abnormal or restricted ROM, impaired physical strength, lacks appropriate home exercise program and pain with function  Symptom irritability: low    Assessment details: Pt is a 74 y.o. female presenting to PT with chronic low back pain and B hip pain. Pt has been consistent with PT, having completed 10 visits of PT. She has significant improvement in hip strength which  translates to decreased pain overall and FOTO improvements. However she does report continued stiffness in the L hip and pain. She was educated that at this time, she may continue to progressively strengthen. She presents with signs/symptoms of gluteal tendinopathy, although cannot rule out bursitis or lumbar component. Pt will benefit from 1 additional appointment to review self progressions and HEP review. Did advise that as she persists with pain, she may be appropriate for MRI to investigate further causes of pain.   Understanding of Dx/Px/POC: good     Prognosis: good    Goals  1. Pt will demonstrate understanding of HEP and POC in order to improve compliance with course of rehab in 2 weeks. (MET)  2. Pt will ambulate with minimal gait deviations in 4 weeks. (MET)  3. Pt's hip ER/ABD MMT will improve by at least 1 grade to decrease hip loads with ambulation by d/c. (Ongoing)  4. Pt's pain will be 2/10 or less to allow pt to return to PLOF with least restriction by d/c. (Ongoing)    Plan  Patient would benefit from: skilled physical therapy    Planned therapy interventions: joint mobilization, manual therapy, neuromuscular re-education, strengthening, stretching, therapeutic activities, therapeutic exercise, home exercise program, functional ROM exercises and flexibility    Frequency: 2x week  Duration in weeks: 8  Treatment plan discussed with: patient      Subjective Evaluation    History of Present Illness  Mechanism of injury: Pt arrives to PT with chief complaints of low back stiffness and B hip stiffness (L>R). She states having treatment for herniated disc decades ago. She has x-ray findings of spondylolisthesis but this is well managed with her own exercises involving strengthening and stretching. Pain level is low however primarily stiffness is primary complaint. with findings of L4, L5.    She has pain relief with lying on bed, pain with long duration standing, bending and lifting.      2/17: Pt  "reports that there are days in which she has minimal to no pain, yesterday she did not notice any stiffness or pain throughout the entire day. Her primary complaint remains L hip stiffness in the morning that improves throughout the day. She overall reports improvement. She states that she does not have follow up with physiatrist.     Quality of life: good    Patient Goals  Patient goals for therapy: decreased pain, increased motion, increased strength and independence with ADLs/IADLs    Pain  Current pain ratin  At best pain ratin  At worst pain rating: 3  Location: low back, B pain  Quality: tight, discomfort and dull ache          Objective    Flowsheet Rows      Flowsheet Row Most Recent Value   PT/OT G-Codes    Current Score 64   Projected Score 69            Lumbar ROM:  Flex: WNL  Ext: WNL  SB: symmetrical and normal B     Core stability:  Good posterior pelvic tilt  Fair stability with abdominals in sagittal plane and transverse plane    Hip strength:  Hip abd: 4/5; 4/5  Hip ER: 4/5; 4/5  Hip Ext: 3+/5; 3+/5    Hip ROM:  Flex: 115° B  Hip ER: 60° B  Hip IR: 20° B (slight glute discomfort B)    Hip special tests:  Scour negative  Femoral impingement negative  LYLY: negative    Palpation:  Lumbar paraspinal soft tissue tension and tenderness B  Glute med/max tension B (slight tenderness/recreation of pain)    Joint mobility:  Lumbar PA: L1-S1 normal  Lumbar UPA: L1/L2-L5/S1 normal B         Plan: Continue per plan of care.      Precautions: L4 L5 spondlylolisthesis      Manuals 1/16 1/20 1/22 1/29 1/31 2/3 2/5 2/10 2/12 2/17   Lumbar paraspinal release  +STM MO +STM MO DL MO MO MO MO DL DL   Glute release   MO MO L DL pin/stretch MO MO MO MO DL DL                             Neuro Re-Ed             Isometric abdominal crunch  5\"x10 5\"x10 5\"x10 5\"x10 5\"x10 5\"x12  Pball press 5\"x12 pball press 5\"x12 pball press 5\"x12 pball press   bridge HEP Reviewed 5x 5\" 2x10 5\" 2x10 5\" 2x10 5\"  2x10 5\" 2x10 5\" 2x10 " "Pball 5\" 2x10 Pball 5\" 2x10                Paloff press    NV double Red Double red 10x ea Double red 10x ea Double red 10x ea Double red 10x ea Double red 10x ea.  Double red 10x ea.    Row  Red 15x Red  2x10 Black 3x10 Black 3x10 Black 3x10 Black 3x10 Black  3x10 Black 3x12 Black 3x15   Shoulder ext  Red 15x Red 2x10 Grn 3x10 Grn 3x10 Grn 3x10 Grn 3x10 Grn 3x10 Blue 3x10 Blue 3x10   deadbug      10x 10x heel taps 10x heel taps 10x heel taps 10x heel taps                Ther Ex             S/l clam Red HEP Reviewed red 5x Red 2x10  B/L Red 2x10 B/L Red 2x10 B/L Red 2x10 B/L Red 2x10 B/L Grn 2x10 B/L Grn 2x10 B/L Review (Grn or blue)   S/l hip abduction HEP Reviewed 5x 2x10  B/L 2x10 B/L 2x10  B/L 2x10 B/L 2x10 B/L 2x10 B/L 2x10 B/L 2x10 B/L   Tail wag HEP Reviewed 5x 15x 15x ea 15x  ea 15x ea 15x  ea 15x ea 15x ea    Cat cow HEP Reviewed 5x 15x ea 15x ea 15x  ea 15x ea 15x  ea 15x ea 15x ea    Lateral band walk   NV Grn 3 laps Grn 3 laps Grn 3 laps Grn 3 laps Grn 3 laps Grn 4 laps Grn 4 laps   Fire hydrant     Stand 2x10 ea.  Stand 2x10 Stand 2x10 Stand 2x10 Stand 2x10  Stand 2x10 Stand 2x10    Hamstring curl    5# 3x10  5# 3x10 5# 3x10 5# 3x10 5# 3x10 5# 3x10 ea 5# 3x10 ea.    Tests/measures          See obj.    HEP review Reviewed, dispense d red TB                                      Ther Activity                                       Gait Training                                       Modalities                                                                            "

## 2025-02-19 ENCOUNTER — APPOINTMENT (OUTPATIENT)
Dept: PHYSICAL THERAPY | Facility: CLINIC | Age: 75
End: 2025-02-19
Payer: MEDICARE

## 2025-02-20 ENCOUNTER — APPOINTMENT (OUTPATIENT)
Dept: PHYSICAL THERAPY | Facility: CLINIC | Age: 75
End: 2025-02-20
Payer: MEDICARE

## 2025-02-21 ENCOUNTER — OFFICE VISIT (OUTPATIENT)
Dept: PHYSICAL THERAPY | Facility: CLINIC | Age: 75
End: 2025-02-21
Payer: MEDICARE

## 2025-02-21 DIAGNOSIS — M54.50 CHRONIC BILATERAL LOW BACK PAIN, UNSPECIFIED WHETHER SCIATICA PRESENT: Primary | ICD-10-CM

## 2025-02-21 DIAGNOSIS — M25.551 BILATERAL HIP PAIN: ICD-10-CM

## 2025-02-21 DIAGNOSIS — G89.29 CHRONIC BILATERAL LOW BACK PAIN, UNSPECIFIED WHETHER SCIATICA PRESENT: Primary | ICD-10-CM

## 2025-02-21 DIAGNOSIS — M25.552 BILATERAL HIP PAIN: ICD-10-CM

## 2025-02-21 PROCEDURE — 97110 THERAPEUTIC EXERCISES: CPT | Performed by: PHYSICAL THERAPIST

## 2025-02-21 PROCEDURE — 97112 NEUROMUSCULAR REEDUCATION: CPT | Performed by: PHYSICAL THERAPIST

## 2025-02-21 NOTE — PROGRESS NOTES
"Discharge    Today's date: 2025  Patient name: Kelli Arias  : 1950  MRN: [id number]  Referring provider: Jose Enrique Pappas MD  Dx:   Encounter Diagnosis     ICD-10-CM    1. Chronic bilateral low back pain, unspecified whether sciatica present  M54.50     G89.29       2. Bilateral hip pain  M25.551     M25.552                      Subjective: Pt reports that today is a good day. She has minimal to no stiffness or pain in the hip. She does report overall pain in the hip has gotten significantly better.       Objective: See treatment diary below      Assessment: Performed all exercises listed with a few progressions. Pt with good response to POC and overall improvement in function and pain. Provided updated HEP to include all current exercises. Provided Black band for rows and shoulder extensions. Significant amount of time spent on educating pt on nature of tendinopathy and mechanical tension leading to improvement in tendon structure/health. Pt is appropriate for discharge from PT.       Plan: Continue per plan of care.      Precautions: L4 L5 spondlylolisthesis      Manuals 2/21 1/20 1/22 1/29 1/31 2/3 2/5 2/10 2/12 2/17   Lumbar paraspinal release DL +STM MO +STM MO DL MO MO MO MO DL DL   Glute release  DL MO MO L DL pin/stretch MO MO MO MO DL DL                             Neuro Re-Ed             Isometric abdominal crunch 5\"x15 pball press 5\"x10 5\"x10 5\"x10 5\"x10 5\"x10 5\"x12  Pball press 5\"x12 pball press 5\"x12 pball press 5\"x12 pball press   bridge Pball 5\" 2x10 Reviewed 5x 5\" 2x10 5\" 2x10 5\" 2x10 5\"  2x10 5\" 2x10 5\" 2x10 Pball 5\" 2x10 Pball 5\" 2x10                Paloff press    NV double Red Double red 10x ea Double red 10x ea Double red 10x ea Double red 10x ea Double red 10x ea.  Double red 10x ea.    Row Black 3x15 Red 15x Red  2x10 Black 3x10 Black 3x10 Black 3x10 Black 3x10 Black  3x10 Black 3x12 Black 3x15   Shoulder ext Black 3x15 Red 15x Red 2x10 Grn 3x10 Grn 3x10 Grn 3x10 Grn 3x10 " Grn 3x10 Blue 3x10 Blue 3x10   deadbug      10x 10x heel taps 10x heel taps 10x heel taps 10x heel taps                Ther Ex             S/l clam Grn 3x10 Reviewed red 5x Red 2x10  B/L Red 2x10 B/L Red 2x10 B/L Red 2x10 B/L Red 2x10 B/L Grn 2x10 B/L Grn 2x10 B/L Review (Grn or blue)   S/l hip abduction 1# 2x10 Reviewed 5x 2x10  B/L 2x10 B/L 2x10  B/L 2x10 B/L 2x10 B/L 2x10 B/L 2x10 B/L 2x10 B/L   Tail wag  Reviewed 5x 15x 15x ea 15x  ea 15x ea 15x  ea 15x ea 15x ea    Cat cow  Reviewed 5x 15x ea 15x ea 15x  ea 15x ea 15x  ea 15x ea 15x ea    Lateral band walk Grn 4 laps  NV Grn 3 laps Grn 3 laps Grn 3 laps Grn 3 laps Grn 3 laps Grn 4 laps Grn 4 laps   Fire hydrant  Stand 2x10 grn   Stand 2x10 ea.  Stand 2x10 Stand 2x10 Stand 2x10 Stand 2x10  Stand 2x10 Stand 2x10    Hamstring curl 5# 3x10 ea.    5# 3x10  5# 3x10 5# 3x10 5# 3x10 5# 3x10 5# 3x10 ea 5# 3x10 ea.    Tests/measures          See obj.    HEP review Reviewed, dispense d red TB                                      Ther Activity                                       Gait Training                                       Modalities